# Patient Record
Sex: FEMALE | Race: BLACK OR AFRICAN AMERICAN
[De-identification: names, ages, dates, MRNs, and addresses within clinical notes are randomized per-mention and may not be internally consistent; named-entity substitution may affect disease eponyms.]

---

## 2019-04-08 ENCOUNTER — HOSPITAL ENCOUNTER (OUTPATIENT)
Dept: HOSPITAL 97 - ER | Age: 64
Setting detail: OBSERVATION
LOS: 3 days | Discharge: HOME | End: 2019-04-11
Attending: INTERNAL MEDICINE | Admitting: INTERNAL MEDICINE
Payer: COMMERCIAL

## 2019-04-08 VITALS — BODY MASS INDEX: 54.6 KG/M2

## 2019-04-08 DIAGNOSIS — K57.90: ICD-10-CM

## 2019-04-08 DIAGNOSIS — K44.9: ICD-10-CM

## 2019-04-08 DIAGNOSIS — R11.2: ICD-10-CM

## 2019-04-08 DIAGNOSIS — K21.0: ICD-10-CM

## 2019-04-08 DIAGNOSIS — K29.70: Primary | ICD-10-CM

## 2019-04-08 LAB
ALBUMIN SERPL BCP-MCNC: 3.7 G/DL (ref 3.4–5)
ALP SERPL-CCNC: 107 U/L (ref 45–117)
ALT SERPL W P-5'-P-CCNC: 21 U/L (ref 12–78)
AST SERPL W P-5'-P-CCNC: 13 U/L (ref 15–37)
BUN BLD-MCNC: 19 MG/DL (ref 7–18)
GLUCOSE SERPLBLD-MCNC: 330 MG/DL (ref 74–106)
HCT VFR BLD CALC: 40.7 % (ref 36–45)
INR BLD: 1.12
LYMPHOCYTES # SPEC AUTO: 1.8 K/UL (ref 0.7–4.9)
MAGNESIUM SERPL-MCNC: 1.8 MG/DL (ref 1.8–2.4)
NT-PROBNP SERPL-MCNC: 159 PG/ML (ref ?–125)
PMV BLD: 9.7 FL (ref 7.6–11.3)
POTASSIUM SERPL-SCNC: 3 MMOL/L (ref 3.5–5.1)
RBC # BLD: 4.92 M/UL (ref 3.86–4.86)
TROPONIN (EMERG DEPT USE ONLY): < 0.02 NG/ML (ref 0–0.04)

## 2019-04-08 PROCEDURE — 80048 BASIC METABOLIC PNL TOTAL CA: CPT

## 2019-04-08 PROCEDURE — 88305 TISSUE EXAM BY PATHOLOGIST: CPT

## 2019-04-08 PROCEDURE — 93005 ELECTROCARDIOGRAM TRACING: CPT

## 2019-04-08 PROCEDURE — 96374 THER/PROPH/DIAG INJ IV PUSH: CPT

## 2019-04-08 PROCEDURE — 80076 HEPATIC FUNCTION PANEL: CPT

## 2019-04-08 PROCEDURE — 87086 URINE CULTURE/COLONY COUNT: CPT

## 2019-04-08 PROCEDURE — 81003 URINALYSIS AUTO W/O SCOPE: CPT

## 2019-04-08 PROCEDURE — 84484 ASSAY OF TROPONIN QUANT: CPT

## 2019-04-08 PROCEDURE — 74175 CTA ABDOMEN W/CONTRAST: CPT

## 2019-04-08 PROCEDURE — 84132 ASSAY OF SERUM POTASSIUM: CPT

## 2019-04-08 PROCEDURE — 83880 ASSAY OF NATRIURETIC PEPTIDE: CPT

## 2019-04-08 PROCEDURE — 71275 CT ANGIOGRAPHY CHEST: CPT

## 2019-04-08 PROCEDURE — 88312 SPECIAL STAINS GROUP 1: CPT

## 2019-04-08 PROCEDURE — 82962 GLUCOSE BLOOD TEST: CPT

## 2019-04-08 PROCEDURE — 36415 COLL VENOUS BLD VENIPUNCTURE: CPT

## 2019-04-08 PROCEDURE — 87088 URINE BACTERIA CULTURE: CPT

## 2019-04-08 PROCEDURE — 83735 ASSAY OF MAGNESIUM: CPT

## 2019-04-08 PROCEDURE — 99285 EMERGENCY DEPT VISIT HI MDM: CPT

## 2019-04-08 PROCEDURE — 96375 TX/PRO/DX INJ NEW DRUG ADDON: CPT

## 2019-04-08 PROCEDURE — 85025 COMPLETE CBC W/AUTO DIFF WBC: CPT

## 2019-04-08 PROCEDURE — 71045 X-RAY EXAM CHEST 1 VIEW: CPT

## 2019-04-08 PROCEDURE — 83690 ASSAY OF LIPASE: CPT

## 2019-04-08 PROCEDURE — 85610 PROTHROMBIN TIME: CPT

## 2019-04-08 RX ADMIN — SODIUM CHLORIDE SCH MG: 0.9 INJECTION, SOLUTION INTRAVENOUS at 22:24

## 2019-04-08 RX ADMIN — HUMAN INSULIN SCH UNIT: 100 INJECTION, SOLUTION SUBCUTANEOUS at 22:53

## 2019-04-08 RX ADMIN — SODIUM CHLORIDE AND POTASSIUM CHLORIDE SCH MLS: .9; .15 SOLUTION INTRAVENOUS at 17:00

## 2019-04-08 RX ADMIN — Medication SCH ML: at 22:54

## 2019-04-08 NOTE — RAD REPORT
EXAM DESCRIPTION:  CT - Angio  Aorta For Dissection - 4/8/2019 3:37 pm

 

CLINICAL HISTORY:  Chest pain radiating to the back.

Abdominal distention;Pain;Dissection

 

COMPARISON:  No comparisons

 

TECHNIQUE:  CT angiography of the aorta was performed with MIPs.

 

All CT scans are performed using dose optimization technique as appropriate and may include automated
 exposure control or mA/KV adjustment according to patient size.

 

FINDINGS:  A left aortic arch is present with normal branching pattern of the great vessels.No acute 
aortic finding is seen such as aneurysm, penetrating ulcer or dissection.  The celiac axis, SMA, YVONNE 
and renal arteries are widely patent.

 

No evidence of pulmonary embolism.

 

The lungs are clear.

 

The liver demonstrates no focal mass or biliary dilatation.Cholecystectomy clips.The spleen, pancreas
, adrenal glands and kidneys are within normal limits for arterial phase imaging.Moderate fat contain
ing umbilical hernia.

 

No bowel obstruction, free fluid or abscess.Sigmoid diverticulosis diverticulitis. Normal appendix.No
 pathologic enlarged lymphadenopathy identified.

 

No fracture or worrisome bone lesion seen.Lower lumbar degenerative changes are present.

 

 

IMPRESSION:  No acute aortic finding is demonstrated.

 

Moderate fat containing umbilical hernia.

## 2019-04-08 NOTE — ER
Nurse's Notes                                                                                     

 [facility]                                                                 

Name: Liane Burk                                                                               

Age: 64 yrs                                                                                       

Sex: Female                                                                                       

: 1955                                                                                   

MRN: [id number]                                                                                   

Arrival Date: 2019                                                                          

Time: 13:48                                                                                       

Account#: [id number]                                                                            

Bed 18                                                                                            

Private MD: Erickson Cano V                                                                      

Diagnosis: Chest pain, unspecified;Dysphagia;Abdominal tenderness;Essential (primary)             

  hypertension;Obesity, unspecified;Type 2 diabetes mellitus;Hypokalemia                          

                                                                                                  

Presentation:                                                                                     

                                                                                             

13:52 Presenting complaint: Patient states: epigastric discomfort and mid-low back pain x 3   ss  

      days. Transition of care: patient was not received from another setting of care. Onset      

      of symptoms was 2019. Risk Assessment: Do you want to hurt yourself or            

      someone else? Patient reports no desire to harm self or others. Initial Sepsis Screen:      

      Does the patient meet any 2 criteria? No. Patient's initial sepsis screen is negative.      

      Does the patient have a suspected source of infection? No. Patient's initial sepsis         

      screen is negative. Care prior to arrival: None.                                            

13:52 Method Of Arrival: Ambulatory                                                           ss  

13:52 Acuity: ROGELIO 3                                                                           ss  

                                                                                                  

Triage Assessment:                                                                                

13:55 General: Appears in no apparent distress. uncomfortable. General: Behavior is calm,     hj  

      cooperative, appropriate for age. Pain: Complains of pain in back and abdomen. GI:          

      Reports upper abdominal pain.                                                               

13:55 EENT: No signs and/or symptoms were reported regarding the EENT system. Neuro: Level of hj  

      Consciousness is awake, alert, obeys commands, Oriented to person, place, time,             

      situation, Appropriate for age. Cardiovascular: Capillary refill < 3 seconds Patient's      

      skin is warm and dry. Respiratory: Airway is patent Respiratory effort is even,             

      unlabored, Respiratory pattern is regular, symmetrical. : No signs and/or symptoms        

      were reported regarding the genitourinary system. Derm: No signs and/or symptoms            

      reported regarding the dermatologic system. Musculoskeletal: No signs and/or symptoms       

      reported regarding the musculoskeletal system.                                              

                                                                                                  

Historical:                                                                                       

- Allergies:                                                                                      

13:53 PENICILLINS;                                                                            ss  

- PMHx:                                                                                           

13:53 Hypertension;                                                                           ss  

- PSHx:                                                                                           

13:53 Hysterectomy; Cholecystectomy; removal of cyst from left breast;                        ss  

                                                                                                  

- Immunization history:: Adult Immunizations up to date.                                          

- Social history:: Smoking status: Patient/guardian denies using tobacco.                         

- Ebola Screening: : Patient denies exposure to infectious person Patient denies travel           

  to an Ebola-affected area in the 21 days before illness onset.                                  

                                                                                                  

                                                                                                  

Screenin:55 Abuse screen: Denies threats or abuse. Denies injuries from another. Nutritional        hj  

      screening: No deficits noted. Tuberculosis screening: No symptoms or risk factors           

      identified. Fall Risk None identified.                                                      

                                                                                                  

Assessment:                                                                                       

13:55 Reassessment: see triage for assessment;.                                               hj  

13:56 GI: Bowel sounds present X 4 quads.                                                     hj  

14:30 Reassessment: Patient and/or family updated on plan of care and expected duration. Pain hj  

      level reassessed. Patient is alert, oriented x 3, equal unlabored respirations, skin        

      warm/dry/pink. awaiting results and POC; Patient states feeling better.                     

15:30 Reassessment: Patient and/or family updated on plan of care and expected duration. Pain hj  

      level reassessed. Patient is alert, oriented x 3, equal unlabored respirations, skin        

      warm/dry/pink. awaiting POC;.                                                               

16:07 Reassessment: Patient and/or family updated on plan of care and expected duration. Pain hj  

      level reassessed. Patient is alert, oriented x 3, equal unlabored respirations, skin        

      warm/dry/pink. awaiting POC;.                                                               

18:08 Reassessment: Patient and/or family updated on plan of care and expected duration. Pain hj  

      level reassessed. Patient is alert, oriented x 3, equal unlabored respirations, skin        

      warm/dry/pink. PCP in room with family; Patient states feeling better.                      

18:21 Reassessment: Patient and/or family updated on plan of care and expected duration. Pain hj  

      level reassessed. Patient is alert, oriented x 3, equal unlabored respirations, skin        

      warm/dry/pink. awaiting room placement;.                                                    

19:04 Reassessment: Patient appears in no apparent distress at this time. Patient and/or      ed1 

      family updated on plan of care and expected duration. Pain level reassessed. Patient is     

      alert, oriented x 3, equal unlabored respirations, skin warm/dry/pink. Patient denies       

      pain at this time.                                                                          

                                                                                                  

Vital Signs:                                                                                      

13:53  / 125; Pulse 95; Resp 18; Temp 98.1(TE); Pulse Ox 98% on R/A; Weight 128.82 kg;  ss  

      Height 5 ft. 0 in. (152.40 cm); Pain 8/10;                                                  

14:30  / 98; Pulse 87; Resp 18; Pulse Ox 100% on R/A;                                   hj  

15:30  / 88; Pulse 85; Resp 18; Pulse Ox 100% on R/A;                                   hj  

16:08  / 95; Pulse 85; Resp 18; Pulse Ox 100% on R/A;                                   hj  

17:00  / 90; Pulse 87; Resp 18; Pulse Ox 100% on R/A;                                   hj  

17:22  / 88; Pulse 75; Resp 18; Pulse Ox 100% on R/A;                                   hj  

18:09  / 85; Pulse 74; Resp 18; Pulse Ox 100% on R/A;                                   hj  

18:20  / 89; Pulse 78; Resp 18; Pulse Ox 100% on R/A;                                   hj  

19:04  / 86; Pulse 80; Resp 20; Temp 98.1(O); Pulse Ox 100% on R/A; Pain 0/10;          ed1 

20:35  / 88; Pulse 72; Resp 18; Temp 98.4(O); Pulse Ox 100% on R/A; Pain 4/10;          ed1 

13:53 Body Mass Index 55.46 (128.82 kg, 152.40 cm)                                              

                                                                                                  

ED Course:                                                                                        

13:48 Patient arrived in ED.                                                                  mr  

13:48 Erickson Cano MD is Private Physician.                                                 mr  

13:53 Triage completed.                                                                       ss  

13:53 Arm band placed on right wrist.                                                         ss  

13:55 Jethro Kaur RN is Primary Nurse.                                                    hj  

13:56 Patient has correct armband on for positive identification. Placed in gown. Bed in low  hj  

      position. Call light in reach. Side rails up X 1.                                           

14:01 Jas Schultz MD is Attending Physician.                                             darren 

14:37 EKG done, by EKG tech. reviewed by Jas Schultz MD.                                   at1 

14:52 XRAY Chest (1 view) In Process Unspecified.                                             EDMS

15:37 CT Aorta for Dissection In Process Unspecified.                                         EDMS

15:51 No provider procedures requiring assistance completed. Patient did not have IV access   hj  

      during this emergency room visit.                                                           

16:26 Erickson Cano MD is Hospitalizing Provider.                                            darren 

19:04 Primary Nurse role handed off by Jethro Kaur, VIOLETTA                                     ed1 

19:04 Vazquez, Barbara, RN is Primary Nurse.                                                      ed1 

19:04 Resting quietly. Awaiting bed assignment.                                               ed1 

                                                                                                  

Administered Medications:                                                                         

15:46 Drug: NS 0.9% 1000 ml Route: IV; Rate: 125 ml/hr; Site: left antecubital;               hj  

15:46 Drug: morphine 4 mg Route: IVP; Site: left antecubital;                                 hj  

16:47 Follow up: Response: No adverse reaction; Pain is decreased                             hj  

15:46 Drug: Zofran 4 mg Route: IVP; Site: left antecubital;                                   hj  

16:35 Follow up: Response: No adverse reaction                                                hj  

16:36 Follow up: Response: No adverse reaction                                                hj  

15:46 Drug: Potassium Chloride 20 mEq Route: IV; Rate: per protocol; Site: left antecubital;  hj  

15:46 Drug: Potassium Effervescent Tablet 25 mEq Route: PO;                                   hj  

16:35 Follow up: Response: No adverse reaction                                                hj  

15:46 Drug: Pepcid 20 mg Route: IVP; Site: left antecubital;                                  hj  

16:35 Follow up: Response: No adverse reaction                                                hj  

15:47 Drug: Insulin Regular Human 6 units {Co-Signature: frieda (Jethro Kaur RN).} Route: IVP;  jl7 

      Site: left antecubital;                                                                     

16:35 Follow up: Response: No adverse reaction                                                frieda  

                                                                                                  

                                                                                                  

Point of Care Testing:                                                                            

      Blood Glucose:                                                                              

17:00 Blood Glucose: 212 mg/dL;                                                               frieda  

      Ranges:                                                                                     

                                                                                                  

Outcome:                                                                                          

16:28 Decision to Hospitalize by Provider.                                                    [facility] 

21:13 Admitted to Med/surg accompanied by tech, family with patient, via stretcher, room 211, ed1 

      with chart, Report called to  VIOLETTA Agosto                                                      

21:13 Condition: good                                                                             

21:13 Discharge instructions given to patient, family, Instructed on the need for admit,          

      Demonstrated understanding of instructions.                                                 

21:14 Patient left the ED.                                                                    ed1 

                                                                                                  

Signatures:                                                                                       

Dispatcher [employer]                           EDJas Abreu MD MD cha Rivera, Mary mr Smirch, Shelby, RN RN ss Riggs, Erika, RN                        RN   ed1                                                  

Amarilys Valdez, EKG Tech              EKG Tat1                                                  

Jethro Kaur RN RN hj Leal, Jahala, RN RN   jl7                                                  

Jethro malave                                                   

                                                                                                  

**************************************************************************************************

## 2019-04-08 NOTE — RAD REPORT
EXAM DESCRIPTION:  RAD - Chest Single View - 4/8/2019 2:52 pm

 

CLINICAL HISTORY:  Back pain, epigastric pain

 

COMPARISON:  April 2016

 

TECHNIQUE:  AP portable chest image was obtained 1446 hours .

 

FINDINGS:  No peripheral focal lung parenchymal process seen. Interstitial pattern is not substantial
ly different from comparison when adjusting for technique differences. Baseline pattern could mask mi
nimal interstitial edema or infiltrate.

 

 Heart and vasculature are normal. No measurable pleural effusion and no pneumothorax. No acute bony 
abnormality seen. No acute aortic findings suspected.

 

IMPRESSION:  Chest is not substantially different from April 2016 comparison.

 

Baseline pattern could mask earliest interstitial edema or infiltrate.

## 2019-04-08 NOTE — EDPHYS
Physician Documentation                                                                           

 [facility]                                                                 

Name: Liane Burk                                                                               

Age: 64 yrs                                                                                       

Sex: Female                                                                                       

: 1955                                                                                   

MRN: [id number]                                                                                   

Arrival Date: 2019                                                                          

Time: 13:48                                                                                       

Account#: [id number]                                                                            

Bed 18                                                                                            

Private MD: Erickson Cano V                                                                      

ED Physician Jas Schultz                                                                      

HPI:                                                                                              

                                                                                             

15:24 This 64 yrs old Black Female presents to ER via Ambulatory with complaints of Abdominal darren 

      Pain, Back Pain.                                                                            

15:24 The patient presents with pain that is acute, that is chronic. The symptoms are located darren 

      in the low back, lumbar area, left low back and right low back. Onset: The                  

      symptoms/episode began/occurred just prior to arrival. The pain does not radiate.           

                                                                                                  

Historical:                                                                                       

- Allergies:                                                                                      

13:53 PENICILLINS;                                                                            ss  

- PMHx:                                                                                           

13:53 Hypertension;                                                                           ss  

- PSHx:                                                                                           

13:53 Hysterectomy; Cholecystectomy; removal of cyst from left breast;                        ss  

                                                                                                  

- Immunization history:: Adult Immunizations up to date.                                          

- Social history:: Smoking status: Patient/guardian denies using tobacco.                         

- Ebola Screening: : Patient denies exposure to infectious person Patient denies travel           

  to an Ebola-affected area in the 21 days before illness onset.                                  

                                                                                                  

                                                                                                  

ROS:                                                                                              

15:24 Eyes: Negative for injury, pain, redness, and discharge, ENT: Negative for injury,      darren 

      pain, and discharge, Neck: Negative for injury, pain, and swelling, Respiratory:            

      Negative for shortness of breath, cough, wheezing, and pleuritic chest pain, :            

      Negative for injury, bleeding, discharge, and swelling, MS/Extremity: Negative for          

      injury and deformity, Skin: Negative for injury, rash, and discoloration, Neuro:            

      Negative for headache, weakness, numbness, tingling, and seizure, Psych: Negative for       

      depression, anxiety, suicide ideation, homicidal ideation, and hallucinations,              

      Allergy/Immunology: Negative for hives, rash, and allergies, Endocrine: Negative for        

      neck swelling, polydipsia, polyuria, polyphagia, and marked weight changes,                 

      Hematologic/Lymphatic: Negative for swollen nodes, abnormal bleeding, and unusual           

      bruising.                                                                                   

15:24 Constitutional: Positive for malaise.                                                       

15:24 Cardiovascular: Positive for chest pain.                                                    

15:24 Respiratory: Negative for cough, dyspnea on exertion.                                       

15:24 Abdomen/GI: Positive for abdominal pain, of the epigastric area, right upper quadrant       

      and left upper quadrant.                                                                    

                                                                                                  

Exam:                                                                                             

15:24 Constitutional:  This is a well developed, well nourished patient who is awake, alert,  darren 

      and in no acute distress. Head/Face:  Normocephalic, atraumatic. Eyes:  Pupils equal        

      round and reactive to light, extra-ocular motions intact.  Lids and lashes normal.          

      Conjunctiva and sclera are non-icteric and not injected.  Cornea within normal limits.      

      Periorbital areas with no swelling, redness, or edema. ENT:  Nares patent. No nasal         

      discharge, no septal abnormalities noted.  Tympanic membranes are normal and external       

      auditory canals are clear.  Oropharynx with no redness, swelling, or masses, exudates,      

      or evidence of obstruction, uvula midline.  Mucous membranes moist. Neck:  Trachea          

      midline, no thyromegaly or masses palpated, and no cervical lymphadenopathy.  Supple,       

      full range of motion without nuchal rigidity, or vertebral point tenderness.  No            

      Meningismus. Chest/axilla:  Normal chest wall appearance and motion.  Nontender with no     

      deformity.  No lesions are appreciated. Cardiovascular:  Regular rate and rhythm with a     

      normal S1 and S2.  No gallops, murmurs, or rubs.  Normal PMI, no JVD.  No pulse             

      deficits. Respiratory:  Lungs have equal breath sounds bilaterally, clear to                

      auscultation and percussion.  No rales, rhonchi or wheezes noted.  No increased work of     

      breathing, no retractions or nasal flaring. Back:  No spinal tenderness.  No                

      costovertebral tenderness.  Full range of motion. Female :  Normal external               

      genitalia. Skin:  Warm, dry with normal turgor.  Normal color with no rashes, no            

      lesions, and no evidence of cellulitis. MS/ Extremity:  Pulses equal, no cyanosis.          

      Neurovascular intact.  Full, normal range of motion. Neuro:  Awake and alert, GCS 15,       

      oriented to person, place, time, and situation.  Cranial nerves II-XII grossly intact.      

      Motor strength 5/5 in all extremities.  Sensory grossly intact.  Cerebellar exam            

      normal.  Normal gait. Psych:  Awake, alert, with orientation to person, place and time.     

       Behavior, mood, and affect are within normal limits.                                       

15:24 Abdomen/GI: Inspection: abdomen appears normal, Bowel sounds: normal, Palpation: mild       

      abdominal tenderness, Liver: no appreciated palpable abnormalities, Hernia: not             

      appreciated.                                                                                

                                                                                                  

Vital Signs:                                                                                      

13:53  / 125; Pulse 95; Resp 18; Temp 98.1(TE); Pulse Ox 98% on R/A; Weight 128.82 kg;  ss  

      Height 5 ft. 0 in. (152.40 cm); Pain 8/10;                                                  

14:30  / 98; Pulse 87; Resp 18; Pulse Ox 100% on R/A;                                   hj  

15:30  / 88; Pulse 85; Resp 18; Pulse Ox 100% on R/A;                                   hj  

16:08  / 95; Pulse 85; Resp 18; Pulse Ox 100% on R/A;                                   hj  

17:00  / 90; Pulse 87; Resp 18; Pulse Ox 100% on R/A;                                   hj  

17:22  / 88; Pulse 75; Resp 18; Pulse Ox 100% on R/A;                                   hj  

18:09  / 85; Pulse 74; Resp 18; Pulse Ox 100% on R/A;                                   hj  

18:20  / 89; Pulse 78; Resp 18; Pulse Ox 100% on R/A;                                   hj  

19:04  / 86; Pulse 80; Resp 20; Temp 98.1(O); Pulse Ox 100% on R/A; Pain 0/10;          ed1 

20:35  / 88; Pulse 72; Resp 18; Temp 98.4(O); Pulse Ox 100% on R/A; Pain 4/10;          ed1 

13:53 Body Mass Index 55.46 (128.82 kg, 152.40 cm)                                            ss  

                                                                                                  

MDM:                                                                                              

14:01 Patient medically screened.                                                             [facility] 

15:26 Data reviewed: vital signs, nurses notes, lab test result(s), EKG, radiologic studies,  [facility] 

      CT scan, plain films.                                                                       

                                                                                                  

                                                                                             

14:18 Order name: Basic Metabolic Panel; Complete Time: 15:21                                   

                                                                                             

14:18 Order name: CBC with Diff; Complete Time: 15:21                                           

                                                                                             

14:18 Order name: LFT's; Complete Time: 15:21                                                   

                                                                                             

14:18 Order name: Magnesium; Complete Time: 15:21                                               

                                                                                             

14:18 Order name: NT PRO-BNP; Complete Time: 15:21                                              

                                                                                             

14:18 Order name: PT-INR; Complete Time: 15:21                                                  

                                                                                             

14:18 Order name: Troponin (emerg Dept Use Only); Complete Time: 15:21                          

                                                                                             

15:21 Order name: Lipase; Complete Time: 16:17                                                [facility] 

                                                                                             

15:21 Order name: Urine Culture                                                               [facility] 

                                                                                             

16:46 Order name: Urine Dipstick--Ancillary (enter results)                                     

                                                                                             

17:08 Order name: Urine Dipstick-Ancillary                                                    [facility]

                                                                                             

18:25 Order name: Troponin I                                                                  [facility]

                                                                                             

18:46 Order name: Potassium                                                                   [facility]

                                                                                             

21:04 Order name: Potassium                                                                   mt  

                                                                                             

13:55 Order name: EKG; Complete Time: 13:55                                                     

                                                                                             

13:55 Order name: EKG - Nurse/Tech; Complete Time: 14:44                                        

                                                                                             

14:18 Order name: XRAY Chest (1 view); Complete Time: 15:21                                     

                                                                                             

14:18 Order name: Cardiac monitoring; Complete Time: 14:19                                      

                                                                                             

15:21 Order name: CT Aorta for Dissection; Complete Time: 16:17                               [facility] 

                                                                                             

16:33 Order name: CONS Physician Consult                                                      [facility]

                                                                                             

16:33 Order name: CONS Physician Consult                                                      [facility]

                                                                                             

21:13 Order name: Potassium                                                                   [facility]

                                                                                             

14:18 Order name: EKG - Nurse/Tech; Complete Time: 14:43                                        

                                                                                             

14:18 Order name: IV Saline Lock; Complete Time: 14:19                                          

                                                                                             

14:18 Order name: Labs collected and sent; Complete Time: 14:19                                 

                                                                                             

14:18 Order name: O2 Per Protocol; Complete Time: 14:19                                         

                                                                                             

14:18 Order name: O2 Sat Monitoring; Complete Time: 14:19                                       

                                                                                             

15:21 Order name: Urine Dipstick-Ancillary (obtain specimen); Complete Time: 16:48            [facility] 

                                                                                                  

Administered Medications:                                                                         

15:46 Drug: NS 0.9% 1000 ml Route: IV; Rate: 125 ml/hr; Site: left antecubital;               hj  

15:46 Drug: morphine 4 mg Route: IVP; Site: left antecubital;                                 hj  

16:47 Follow up: Response: No adverse reaction; Pain is decreased                             hj  

15:46 Drug: Zofran 4 mg Route: IVP; Site: left antecubital;                                   hj  

16:35 Follow up: Response: No adverse reaction                                                hj  

16:36 Follow up: Response: No adverse reaction                                                hj  

15:46 Drug: Potassium Chloride 20 mEq Route: IV; Rate: per protocol; Site: left antecubital;  hj  

15:46 Drug: Potassium Effervescent Tablet 25 mEq Route: PO;                                   hj  

16:35 Follow up: Response: No adverse reaction                                                hj  

15:46 Drug: Pepcid 20 mg Route: IVP; Site: left antecubital;                                  hj  

16:35 Follow up: Response: No adverse reaction                                                  

15:47 Drug: Insulin Regular Human 6 units {Co-Signature: frieda (Jethro Kaur RN).} Route: IVP;  jl7 

      Site: left antecubital;                                                                     

16:35 Follow up: Response: No adverse reaction                                                frieda  

                                                                                                  

                                                                                                  

Point of Care Testing:                                                                            

      Blood Glucose:                                                                              

17:00 Blood Glucose: 212 mg/dL;                                                               frieda  

      Ranges:                                                                                     

      Critical Glucose Levels:Adult <50 mg/dl or >400 mg/dl  <40 mg/dl or >180 mg/dl       

Disposition:                                                                                      

19 16:28 Hospitalization ordered by Erickson Cano for Observation. Preliminary diagnosis    

  are Chest pain, unspecified, Dysphagia, Abdominal tenderness, Essential                         

  (primary) hypertension, Obesity, unspecified, Type 2 diabetes mellitus,                         

  Hypokalemia.                                                                                    

- Bed requested for Telemetry/MedSurg (observation).                                              

- Status is Observation.                                                                      ed1 

- Condition is Fair.                                                                              

- Problem is new.                                                                                 

- Symptoms have improved.                                                                         

UTI on Admission? No                                                                              

                                                                                                  

                                                                                                  

                                                                                                  

Signatures:                                                                                       

Dispatcher [employer]                           EDMS                                                 

Jas Schultz MD MD cha Smirch, Shelby, RN RN ss Riggs, Erika, RN RN   ed1                                                  

Jethro Kaur RN RN hj Leal, Jahala, RN RN   jl7                                                  

Marilou Coleman RN RN   df                                                   

Jethro malave                                                   

                                                                                                  

Corrections: (The following items were deleted from the chart)                                    

19:47 16:28 Hospitalization Ordered by Erickson Cano MD for Observation. Preliminary diagnosis df  

      is Chest pain, unspecified; Dysphagia; Abdominal tenderness; Essential (primary)            

      hypertension; Obesity, unspecified; Type 2 diabetes mellitus; Hypokalemia. Bed              

      requested for Telemetry/MedSurg (observation). Status is Observation. Condition is          

      Fair. Problem is new. Symptoms have improved. UTI on Admission? No. darren                     

21:14 19:47 2019 16:28 Hospitalization Ordered by Erickson Cano MD for Observation.      ed1 

      Preliminary diagnosis is Chest pain, unspecified; Dysphagia; Abdominal tenderness;          

      Essential (primary) hypertension; Obesity, unspecified; Type 2 diabetes mellitus;           

      Hypokalemia. Bed requested for Telemetry/MedSurg (observation). Status is Observation.      

      Condition is Fair. Problem is new. Symptoms have improved. UTI on Admission? No. df         

                                                                                                  

**************************************************************************************************

## 2019-04-09 LAB
BUN BLD-MCNC: 14 MG/DL (ref 7–18)
GLUCOSE SERPLBLD-MCNC: 151 MG/DL (ref 74–106)
HCT VFR BLD CALC: 36.1 % (ref 36–45)
LYMPHOCYTES # SPEC AUTO: 1.6 K/UL (ref 0.7–4.9)
PMV BLD: 9.7 FL (ref 7.6–11.3)
POTASSIUM SERPL-SCNC: 3.6 MMOL/L (ref 3.5–5.1)
RBC # BLD: 4.36 M/UL (ref 3.86–4.86)

## 2019-04-09 RX ADMIN — SODIUM CHLORIDE SCH MG: 0.9 INJECTION, SOLUTION INTRAVENOUS at 09:22

## 2019-04-09 RX ADMIN — HUMAN INSULIN SCH: 100 INJECTION, SOLUTION SUBCUTANEOUS at 07:30

## 2019-04-09 RX ADMIN — MORPHINE SULFATE PRN MG: 4 INJECTION, SOLUTION INTRAMUSCULAR; INTRAVENOUS at 01:09

## 2019-04-09 RX ADMIN — HUMAN INSULIN SCH UNIT: 100 INJECTION, SOLUTION SUBCUTANEOUS at 22:01

## 2019-04-09 RX ADMIN — METRONIDAZOLE SCH MLS: 500 INJECTION, SOLUTION INTRAVENOUS at 21:57

## 2019-04-09 RX ADMIN — HUMAN INSULIN SCH: 100 INJECTION, SOLUTION SUBCUTANEOUS at 11:30

## 2019-04-09 RX ADMIN — MORPHINE SULFATE PRN MG: 4 INJECTION, SOLUTION INTRAMUSCULAR; INTRAVENOUS at 22:16

## 2019-04-09 RX ADMIN — SODIUM CHLORIDE AND POTASSIUM CHLORIDE SCH MLS: .9; .15 SOLUTION INTRAVENOUS at 06:55

## 2019-04-09 RX ADMIN — SODIUM CHLORIDE SCH MG: 0.9 INJECTION, SOLUTION INTRAVENOUS at 22:15

## 2019-04-09 RX ADMIN — Medication SCH ML: at 09:00

## 2019-04-09 RX ADMIN — Medication SCH: at 21:00

## 2019-04-09 RX ADMIN — CIPROFLOXACIN SCH: 2 INJECTION, SOLUTION INTRAVENOUS at 11:00

## 2019-04-09 RX ADMIN — MORPHINE SULFATE PRN MG: 4 INJECTION, SOLUTION INTRAMUSCULAR; INTRAVENOUS at 09:23

## 2019-04-09 RX ADMIN — METRONIDAZOLE SCH MLS: 500 INJECTION, SOLUTION INTRAVENOUS at 15:00

## 2019-04-09 RX ADMIN — ATENOLOL SCH MG: 50 TABLET ORAL at 22:17

## 2019-04-09 RX ADMIN — ASPIRIN SCH: 81 TABLET, COATED ORAL at 09:00

## 2019-04-09 RX ADMIN — CIPROFLOXACIN SCH MLS: 2 INJECTION, SOLUTION INTRAVENOUS at 22:23

## 2019-04-09 RX ADMIN — HUMAN INSULIN SCH UNIT: 100 INJECTION, SOLUTION SUBCUTANEOUS at 16:30

## 2019-04-09 RX ADMIN — SODIUM CHLORIDE AND POTASSIUM CHLORIDE SCH: .9; .15 SOLUTION INTRAVENOUS at 13:00

## 2019-04-09 NOTE — P.PN
Subjective


Date of Service: 04/09/19


Chief Complaint: DIFFUSE ABDOMEN PAIN.


Subjective: No new changes





Review of Systems


10-point ROS is otherwise unremarkable


General: Weakness, Malaise


Gastrointestinal: Abdominal Pain





Physical Examination





- Vital Signs


Temperature: 97.1 F


Blood Pressure: 137/74


Pulse: 65


Respirations: 16


Pulse Ox (%): 97





- Physical Exam


General: Alert


HEENT: Atraumatic, PERRLA, EOMI


Neck: Supple, JVD not distended


Respiratory: Clear to auscultation bilaterally, Normal air movement


Cardiovascular: Regular rate/rhythm, Normal S1 S2


Gastrointestinal: Tenderness (DIFFUSE MILD TO MODERATE.)


Musculoskeletal: No tenderness


Integumentary: No rashes


Neurological: Normal speech, Normal tone, Normal affect


Lymphatics: No axilla or inguinal lymphadenopathy





- Studies


Laboratory Data (last 24 hrs)





04/08/19 14:20: Lipase 158


04/08/19 14:20: PT 13.2 H, INR 1.12


04/08/19 14:20: WBC 10.3, Hgb 13.8, Hct 40.7, Plt Count 299


04/08/19 14:20: Sodium 139, Potassium 3.0 L, BUN 19 H, Creatinine 0.98, Glucose 

330 H, Magnesium 1.8, Total Bilirubin 0.4, AST 13 L, ALT 21, Alkaline 

Phosphatase 107





Medications List Reviewed: Yes





Assessment And Plan





- Current Problems (Diagnosis)


(1) Epigastric pain


Current Visit: Yes   Status: Acute   


Plan: 


MOSTLY FROM ACID, VS VIRAL SYNDROME 


RAISE PROTONIX IV BID. 





DC IN AM POSSIBLE. 





HER PAIN HAS LOW POSSIBILTY OF BEING CARDIAC IN ORIGIN. 














(2) GERD (gastroesophageal reflux disease)


Current Visit: Yes   Status: Acute   


Plan: 


IV PROTONIX


Qualifiers: 


   Esophagitis presence: with esophagitis   Qualified Code(s): K21.0 - Gastro-

esophageal reflux disease with esophagitis   





(3) Nausea & vomiting


Current Visit: No   Status: Acute   


Plan: 


SHOULD IMPROVE 


NO MECHANICAL ISSUES. 








(4) Diffuse abdominal pain


Current Visit: Yes   Status: Acute   


Plan: 


DIVERICULITIS POSSIBLE.


I TALKED TO DR CASILLAS TO RULE OUT DIVERTICULITIS. 


HE TOLD ME YES-  LOOKS LIKE LOTS OF DIVERTICULOSIS BUT NO DIVERTICULITIS. 


CELIAC AND MESENTERIC ARTERIES ARE GOOD.

## 2019-04-09 NOTE — EKG
Test Date:    2019-04-09               Test Time:    08:06:20

Technician:   PHILIPPE                                     

                                                     

MEASUREMENT RESULTS:                                       

Intervals:                                           

Rate:         71                                     

DE:           166                                    

QRSD:         82                                     

QT:           430                                    

QTc:          467                                    

Axis:                                                

P:            49                                     

DE:           166                                    

QRS:          -25                                    

T:            -2                                     

                                                     

INTERPRETIVE STATEMENTS:                                       

                                                     

Normal sinus rhythm

Non specific T abnormality

Abnormal ECG

Compared to ECG 04/08/2019 14:09:59

Atrial premature complex(es) no longer present

Ventricular premature complex(es) no longer present



Electronically Signed On 04-09-19 10:00:43 CDT by Jagdeep Kimball

## 2019-04-09 NOTE — RAD REPORT
EXAM DESCRIPTION:  RAD - Chest Single View - 4/9/2019 6:43 am

 

CLINICAL HISTORY:  Chest Pain

Chest pain.

 

COMPARISON:  Chest Single View dated 4/8/2019; Chest Pa And Lat (2 Views) dated 4/15/2016; CHEST SING
LE VIEW dated 5/27/2015; CHEST SINGLE VIEW dated 1/26/2014

 

FINDINGS:  Portable technique limits examination quality.

 

The lungs are grossly clear. The heart is normal in size. No displaced fractures.

 

IMPRESSION:  No acute intrathoracic process suspected.

## 2019-04-09 NOTE — EKG
Test Date:    2019-04-08               Test Time:    14:09:59

Technician:   YAZMIN                                     

                                                     

MEASUREMENT RESULTS:                                       

Intervals:                                           

Rate:         94                                     

CO:           150                                    

QRSD:         88                                     

QT:           406                                    

QTc:          507                                    

Axis:                                                

P:            36                                     

CO:           150                                    

QRS:          -32                                    

T:            23                                     

                                                     

INTERPRETIVE STATEMENTS:                                       

                                                     

Sinus rhythm with occasional premature ventricular complexes and premature

atrial complexes

Left axis deviation

Nonspecific ST abnormality

Prolonged QT

Abnormal ECG

Compared to ECG 04/15/2016 12:46:14

Atrial premature complex(es) now present

Ventricular premature complex(es) now present

Left-axis deviation now present

ST (T wave) deviation now present

Prolonged QT interval now present

Sinus arrhythmia no longer present



Electronically Signed On 04-08-19 16:05:59 CDT by Jagdeep Kimball

## 2019-04-09 NOTE — CON
History Of Present Illness:  Mrs. Burk is 64 years old.  She came to the hospital with epigastric pa
in.  It is well below the xiphoid.  She also has lower abdominal pain.  It has been going on for rika
ral days without any readmittance, and since being in the hospital, it is improved slightly.  Her out
patient medications did not include any kind of antacid, but she is getting IV Protonix 40 b.i.d. now
.  She does not have a history of heart disease.  In 2012, a cardiac cath was normal; it was after a 
stress test was abnormal, so she has a history of a false-positive nuclear stress test.  She does not
 have diabetes, dyslipidemia, never had myocardial infarction, stroke, not having chest pain, or abdo
melody pain.  She has a history of diverticulitis and diverticulosis.



Home Medications:  Atenolol, tramadol, nisoldipine, olmesartan, hydrochlorothiazide, and potassium ch
loride.



Allergies:  SHE IS ALLERGIC TO PENICILLIN.



Physical Examination:

General:  She is obese, alert, oriented, pleasant, cooperative, not in distress. 

Vital Signs:  5 feet tall, 280 pounds. 

HEENT:  Normal. 

Lungs:  Clear. 

Cardiac:  Within normal limits. 

Abdomen:  Soft. 

Extremities:  Normal.



Imaging:  EKG does not show infarction, injury, or ischemia.



Impression:  My impression is that Mrs. Burk is not having coronary artery disease or any symptoms t
hat sound remotely like coronary 

artery disease.  I am not going to recommend a cardiac workup at this time on Mrs. Burk.





JARRELL/JAE

DD:  04/09/2019 08:17:35Voice ID:  [id number]

DT:  04/09/2019 09:23:13Report ID:  [id number]

## 2019-04-10 PROCEDURE — 0DB98ZX EXCISION OF DUODENUM, VIA NATURAL OR ARTIFICIAL OPENING ENDOSCOPIC, DIAGNOSTIC: ICD-10-PCS

## 2019-04-10 PROCEDURE — 0DJ08ZZ INSPECTION OF UPPER INTESTINAL TRACT, VIA NATURAL OR ARTIFICIAL OPENING ENDOSCOPIC: ICD-10-PCS

## 2019-04-10 RX ADMIN — PANTOPRAZOLE SODIUM SCH MG: 40 TABLET, DELAYED RELEASE ORAL at 14:52

## 2019-04-10 RX ADMIN — METRONIDAZOLE SCH MLS: 500 INJECTION, SOLUTION INTRAVENOUS at 08:11

## 2019-04-10 RX ADMIN — Medication SCH: at 21:00

## 2019-04-10 RX ADMIN — METOCLOPRAMIDE SCH MG: 5 INJECTION, SOLUTION INTRAMUSCULAR; INTRAVENOUS at 14:51

## 2019-04-10 RX ADMIN — CIPROFLOXACIN SCH MLS: 2 INJECTION, SOLUTION INTRAVENOUS at 08:12

## 2019-04-10 RX ADMIN — SODIUM CHLORIDE SCH MG: 0.9 INJECTION, SOLUTION INTRAVENOUS at 11:01

## 2019-04-10 RX ADMIN — MORPHINE SULFATE PRN MG: 4 INJECTION, SOLUTION INTRAMUSCULAR; INTRAVENOUS at 14:50

## 2019-04-10 RX ADMIN — HUMAN INSULIN SCH UNIT: 100 INJECTION, SOLUTION SUBCUTANEOUS at 21:41

## 2019-04-10 RX ADMIN — POTASSIUM CHLORIDE SCH MEQ: 10 TABLET, FILM COATED, EXTENDED RELEASE ORAL at 14:52

## 2019-04-10 RX ADMIN — SODIUM CHLORIDE AND POTASSIUM CHLORIDE SCH: .9; .15 SOLUTION INTRAVENOUS at 19:00

## 2019-04-10 RX ADMIN — HUMAN INSULIN SCH UNIT: 100 INJECTION, SOLUTION SUBCUTANEOUS at 08:37

## 2019-04-10 RX ADMIN — ASPIRIN SCH MG: 81 TABLET, COATED ORAL at 14:52

## 2019-04-10 RX ADMIN — METOCLOPRAMIDE SCH MG: 5 INJECTION, SOLUTION INTRAMUSCULAR; INTRAVENOUS at 17:44

## 2019-04-10 RX ADMIN — Medication SCH: at 09:00

## 2019-04-10 RX ADMIN — ATENOLOL SCH MG: 50 TABLET ORAL at 21:41

## 2019-04-10 RX ADMIN — SODIUM CHLORIDE AND POTASSIUM CHLORIDE SCH: .9; .15 SOLUTION INTRAVENOUS at 09:00

## 2019-04-10 RX ADMIN — VALSARTAN SCH MG: 80 TABLET ORAL at 14:51

## 2019-04-10 RX ADMIN — Medication SCH ML: at 08:12

## 2019-04-10 RX ADMIN — HUMAN INSULIN SCH: 100 INJECTION, SOLUTION SUBCUTANEOUS at 16:30

## 2019-04-10 RX ADMIN — CIPROFLOXACIN SCH MLS: 2 INJECTION, SOLUTION INTRAVENOUS at 21:42

## 2019-04-10 RX ADMIN — METRONIDAZOLE SCH MLS: 500 INJECTION, SOLUTION INTRAVENOUS at 21:42

## 2019-04-10 RX ADMIN — SODIUM CHLORIDE SCH MG: 0.9 INJECTION, SOLUTION INTRAVENOUS at 22:22

## 2019-04-10 RX ADMIN — HUMAN INSULIN SCH: 100 INJECTION, SOLUTION SUBCUTANEOUS at 11:30

## 2019-04-10 RX ADMIN — SODIUM CHLORIDE AND POTASSIUM CHLORIDE SCH MLS: .9; .15 SOLUTION INTRAVENOUS at 01:05

## 2019-04-10 NOTE — ENDO RPT
[address], [address]





EGD PROCEDURE REPORT     EXAM DATE: 04/10/2019



PATIENT NAME:          Liane Burk          MR#:       [id number]

YOB: 1955     VISIT #:     [id number]

ATTENDING:     Jason Wright Dr     STATUS:     inpatient - \[facility] [place] [facility]\""

ASSISTANT:      Tammie Larios RN and Kathleen Ulrich



INDICATIONS:  The patient is a 64 yr old Female here for an EGD due to mid

epigastric abdominal pain, nausea and vomiting, GERD, and chest pain

PROCEDURE PERFORMED:     EGD with biopsy

MEDICATIONS:     Per Anesthesia.

TOPICAL ANESTHETIC:     none



CONSENT:  The patient understands the risks and benefits of the procedure and

understands that these risks include, but are not limited to: sedation,

allergic reaction, infection, perforation and/or bleeding. Alternative means of

evaluation and treatment include, among others: physical exam, x-rays, and/or

surgical intervention. The patient elects to proceed with this endoscopic

procedure.



DESCRIPTION OF PROCEDURE:  During intra-op preparation period all mechanical 

medical equipment was checked for proper function. Hand hygiene and appropriate

measures for infection prevention was taken.  Procedure, possible

complications, and alternatives including but not limited to the possibility of

bleeding, perforation, tear, infection, sepsis, need for surgery, need for

blood transfusion, and anesthesia related complications were explained to the

patient.  After the risks, benefits and alternatives of the procedure were

thoroughly explained, Informed consent was verified, confirmed and timeout was

successfully executed by the treatment team. The patient was  placed in the

left lateral position.  The patient was anesthetized with topical anesthesia.

Through the anesthetized oropharyngeal area, the scope was passed without any

difficulty.  The [id number] ([id number]) endoscope was introduced through the mouth

and advanced to the second portion of the duodenum.  Retroflexed views revealed

no abnormalities.  The gastroscope was then slowly withdrawn and removed.



A small sliding hiatal hernia was found.  Mild to moderate gastritis was found

in the body and the antrum of the stomach.  Multiple biopsies were obtained and

sent to pathology.







ADVERSE EVENTS:     There were no complications.

IMPRESSIONS:     1.  Small sliding hiatal hernia

2.  Mild to moderate gastritis in the body and the antrum of the stomach, s/p

biopsies



RECOMMENDATIONS:     1.  await biopsy results

2.  acid suppression therapy

REPEAT EXAM:





___________________________________

Jason Wright Dr

eSigned:  Jason Wright Dr 04/10/2019 1:22 PM





cc: Erickson Cano



CPT CODES:

ICD9 CODES:





PATIENT NAME:  Liane Burk

MR#: [id number]

## 2019-04-10 NOTE — P.PN
Subjective


Date of Service: 04/10/19


Chief Complaint: DIFFUSE ABDOMEN PAIN.


Subjective: Improving





SHE IS FEELING BETTER.  DR BRAGG WANTED TO DO EGD. DISCHARGE DEPENDS ON HIM 

NOW.  HE DOES NOT TAKE HER INSURANCE SO SHE WILL HAVE TO FIND A NEW GI DOCTOR.  





Review of Systems


10-point ROS is otherwise unremarkable


Gastrointestinal: Nausea





Physical Examination





- Vital Signs


Temperature: 97.4 F


Blood Pressure: 141/63


Pulse: 56


Respirations: 16


Pulse Ox (%): 100





- Physical Exam


General: Alert, Mild distress, Obese


HEENT: Atraumatic, PERRLA, EOMI


Neck: Supple, JVD not distended


Respiratory: Clear to auscultation bilaterally, Normal air movement


Cardiovascular: Regular rate/rhythm, Normal S1 S2


Gastrointestinal: Normal bowel sounds, No tenderness


Musculoskeletal: No tenderness


Integumentary: No rashes


Neurological: Normal speech, Normal tone, Normal affect


Lymphatics: No axilla or inguinal lymphadenopathy





- Studies


Medications List Reviewed: Yes





Assessment And Plan





- Current Problems (Diagnosis)


(1) Epigastric pain


Current Visit: Yes   Status: Acute   


Plan: 


MOSTLY FROM ACID, VS VIRAL SYNDROME 


RAISE PROTONIX IV BID. 





DC IN AM POSSIBLE. 





HER PAIN HAS LOW POSSIBILTY OF BEING CARDIAC IN ORIGIN. 











MILD TO MODERATE GASTRITIS


SHE IS ALREADY ON PROTONIX IV BID. 


SHE IS STABLE. 


I HAD WRITTEN DISCHARGE ORDERS WITH [facility]. 


DC PLAN PER DR. MICHELLE.








(2) GERD (gastroesophageal reflux disease)


Current Visit: Yes   Status: Acute   


Plan: 


IV PROTONIX


Qualifiers: 


   Esophagitis presence: with esophagitis   Qualified Code(s): K21.0 - Gastro-

esophageal reflux disease with esophagitis   





(3) Nausea & vomiting


Current Visit: No   Status: Acute   


Plan: 


SHOULD IMPROVE 


NO MECHANICAL ISSUES. 








(4) Diffuse abdominal pain


Current Visit: Yes   Status: Acute   


Plan: 


DIVERICULITIS POSSIBLE.


I TALKED TO DR CASILLAS TO RULE OUT DIVERTICULITIS. 


HE TOLD ME YES-  LOOKS LIKE LOTS OF DIVERTICULOSIS BUT NO DIVERTICULITIS. 


CELIAC AND MESENTERIC ARTERIES ARE GOOD.

## 2019-04-11 VITALS — OXYGEN SATURATION: 99 %

## 2019-04-11 VITALS — DIASTOLIC BLOOD PRESSURE: 68 MMHG | SYSTOLIC BLOOD PRESSURE: 142 MMHG | TEMPERATURE: 97.6 F

## 2019-04-11 PROCEDURE — 0DJD8ZZ INSPECTION OF LOWER INTESTINAL TRACT, VIA NATURAL OR ARTIFICIAL OPENING ENDOSCOPIC: ICD-10-PCS

## 2019-04-11 RX ADMIN — POTASSIUM CHLORIDE SCH MEQ: 10 TABLET, FILM COATED, EXTENDED RELEASE ORAL at 08:34

## 2019-04-11 RX ADMIN — HUMAN INSULIN SCH: 100 INJECTION, SOLUTION SUBCUTANEOUS at 07:30

## 2019-04-11 RX ADMIN — SODIUM CHLORIDE SCH MG: 0.9 INJECTION, SOLUTION INTRAVENOUS at 08:34

## 2019-04-11 RX ADMIN — Medication SCH: at 08:39

## 2019-04-11 RX ADMIN — HUMAN INSULIN SCH: 100 INJECTION, SOLUTION SUBCUTANEOUS at 11:30

## 2019-04-11 RX ADMIN — SODIUM CHLORIDE AND POTASSIUM CHLORIDE SCH MLS: .9; .15 SOLUTION INTRAVENOUS at 03:43

## 2019-04-11 RX ADMIN — PANTOPRAZOLE SODIUM SCH MG: 40 TABLET, DELAYED RELEASE ORAL at 08:34

## 2019-04-11 RX ADMIN — HUMAN INSULIN SCH: 100 INJECTION, SOLUTION SUBCUTANEOUS at 16:30

## 2019-04-11 RX ADMIN — METOCLOPRAMIDE SCH MG: 5 INJECTION, SOLUTION INTRAMUSCULAR; INTRAVENOUS at 00:36

## 2019-04-11 RX ADMIN — SODIUM CHLORIDE AND POTASSIUM CHLORIDE SCH: .9; .15 SOLUTION INTRAVENOUS at 15:00

## 2019-04-11 RX ADMIN — ASPIRIN SCH: 81 TABLET, COATED ORAL at 08:38

## 2019-04-11 RX ADMIN — CIPROFLOXACIN SCH MLS: 2 INJECTION, SOLUTION INTRAVENOUS at 08:37

## 2019-04-11 RX ADMIN — Medication SCH ML: at 08:38

## 2019-04-11 RX ADMIN — VALSARTAN SCH MG: 80 TABLET ORAL at 08:35

## 2019-04-11 RX ADMIN — METRONIDAZOLE SCH MLS: 500 INJECTION, SOLUTION INTRAVENOUS at 08:36

## 2019-04-11 NOTE — P.DS
Admission Date: 04/08/19


Discharge Date: 04/11/19


Disposition: ROUTINE DISCHARGE


Reason for Admission: DIFFUSE ABDOMEN PAIN.





- Problems


(1) Epigastric pain


Current Visit: Yes   Status: Acute   





(2) GERD (gastroesophageal reflux disease)


Current Visit: Yes   Status: Acute   


Qualifiers: 


   Esophagitis presence: with esophagitis   Qualified Code(s): K21.0 - Gastro-

esophageal reflux disease with esophagitis   





(3) Nausea & vomiting


Current Visit: No   Status: Acute   





(4) Diffuse abdominal pain


Current Visit: Yes   Status: Acute   


Brief History of Present Illness: 





MS. ZULETA HAS BURNING CHEST PAIN RADIATING TO BACK FOR TWO DAYS. SHE CALLED DR. MICHELLE HER GI DOCTOR BUT HE HAS QUIT TAKING HER INSURANCE.  SHE COMES TO ER.  

SHE HAS SOME NAUSEA AND VOMITING ABOUT 3 TIMES IN TWO DAYS.  





MS ZULETA IS LOT BETTER.  SHE IS NOT TAKING PROTONIX SHE HAS BUT NOW SHE WILL.  

I STOPPED KCL AND ADDED TO SPIRONOLACTONE AS KCL CAN IRRITANTE THE STOMACH. SHE 

IS STABLE TO GO HOME.


Vital Signs/Physical Exam: 














Temp Pulse Resp BP Pulse Ox


 


 98.8 F   64   18   150/45 H  98 


 


 04/11/19 12:34  04/11/19 12:34  04/11/19 12:34  04/11/19 12:34  04/11/19 12:00








Laboratory Data at Discharge: 














WBC  7.3 K/uL (4.3-10.9)  D 04/09/19  04:29    


 


Hgb  11.8 g/dL (12.0-15.0)  L  04/09/19  04:29    


 


Hct  36.1 % (36.0-45.0)   04/09/19  04:29    


 


Plt Count  242 K/uL (152-406)   04/09/19  04:29    


 


PT  13.2 SECONDS (9.5-12.5)  H  04/08/19  14:20    


 


INR  1.12   04/08/19  14:20    


 


Sodium  145 mmol/L (136-145)   04/09/19  04:29    


 


Potassium  3.6 mmol/L (3.5-5.1)   04/09/19  04:29    


 


BUN  14 mg/dL (7-18)   04/09/19  04:29    


 


Creatinine  0.68 mg/dL (0.55-1.3)   04/09/19  04:29    


 


Glucose  151 mg/dL ()  H  04/09/19  04:29    


 


Magnesium  1.8 mg/dL (1.8-2.4)   04/08/19  14:20    


 


Total Bilirubin  0.4 mg/dL (0.2-1.0)   04/08/19  14:20    


 


AST  13 U/L (15-37)  L  04/08/19  14:20    


 


ALT  21 U/L (12-78)   04/08/19  14:20    


 


Alkaline Phosphatase  107 U/L ()   04/08/19  14:20    


 


Troponin I  < 0.02 ng/mL (0.0-0.045)   04/08/19  23:49    


 


Lipase  158 U/L ()   04/08/19  14:20    








Home Medications: 








Atenolol 1 tab PO BEDTIME 04/15/16 


Nisoldipine 1 tab PO DAILY 04/15/16 


Olmesartan/Hydrochlorothiazide [Benicar Hct 40-12.5 mg Tablet] 1 tab PO DAILY 04

/15/16 


Pantoprazole Sodium [Protonix] 40 mg PO DAILY 04/09/19 


Ciprofloxacin HCl [Cipro 500 MG Tablet] 500 mg PO BID #20 tab 04/10/19 


metroNIDAZOLE [Flagyl] 500 mg PO Q8H #30 tablet 04/10/19 


Pantoprazole [Protonix Tab*] 40 mg PO DAILY #90 tab 04/11/19 


Spironolactone [Aldactone*] 25 mg PO DAILY #90 tab 04/11/19 





New Medications: 


Ciprofloxacin HCl [Cipro 500 MG Tablet] 500 mg PO BID #20 tab


metroNIDAZOLE [Flagyl] 500 mg PO Q8H #30 tablet


Pantoprazole [Protonix Tab*] 40 mg PO DAILY #90 tab


Spironolactone [Aldactone*] 25 mg PO DAILY #90 tab


Followup: 


AdrianaJason MD [ASSOCIATE-ACTIVE - CAN ADMIT] -

## 2019-04-11 NOTE — ENDO RPT
[address], [address]





COLONOSCOPY PROCEDURE REPORT     EXAM DATE: 04/11/2019



PATIENT NAME:      Liane Burk           MR #:      [id number]

YOB: 1955      VISIT #:     [id number]

ATTENDING:     Jason Wright Dr     STATUS:     outpatient

ASSISTANT:      Maureen Leblanc RN and Kathleen Ulrich



INDICATIONS:  The patient is a 64 yr old Female here for a colonoscopy due to

abdominal pain and family history of colon cancer

PROCEDURE PERFORMED:     Colonoscopy

MEDICATIONS:     Per Anesthesia.

ESTIMATED BLOOD LOSS:     None



CONSENT: The patient understands the risks and benefits of the procedure and

understands that these risks include, but are not limited to: sedation,

allergic reaction, infection, perforation and/or bleeding. Alternative means of

evaluation and treatment include, among others: physical exam, x-rays, and/or

surgical intervention. The patient elects to proceed with this endoscopic

procedure.



DESCRIPTION OF PROCEDURE:  During intra-op preparation period all mechanical 

medical equipment was checked for proper function. Hand hygiene and appropriate

measures for infection prevention was taken.  Procedure, possible

complications, alternatives including, but not limited to possibility of

bleeding, perforation, tear, infection, sepsis, need for surgery, need for

blood transfusion, were explained to the patient.  After the risks, benefits

and alternatives of the procedure were thoroughly explained, Informed consent

was verified, confirmed and timeout was successfully executed by the treatment

team.  The patient was placed in the left lateral position.   A digital rectal

exam was performed and revealed no abnormalities of the rectum.  After

appropriate level of anesthesia, the scope was passed.  The [id number] ([id number])

endoscope was introduced through the anus and advanced to the proximal

transverse colon. The quality of the prep was fair. The instrument was then

slowly withdrawn as the colon was fully examined.  Scope withdrawal time was 7

minutes.



COLON FINDINGS: Moderate diverticulosis was noted throughout the entire examined

colon.  No bleeding was noted from the diverticulosis.   Small internal

hemorrhoids were found.  Retroflexed views revealed small hemorrhoids. The

scope was then completely withdrawn from the patient and the procedure

terminated.







ADVERSE EVENTS:      There were no complications.

IMPRESSIONS:     1.  Moderate diverticulosis throughout the entire examined

colon, predominantly in the sigmoid colon

2.  Small internal hemorrhoids

3.  Intubation to proximal transverse colon, stopping there due to probability

of LLQ/RLQ pain being due to mild diverticulitis (could worsen from pressure of



on IV antibiotics



RECOMMENDATIONS:     continue IV antibiotics

RECALL:     Return in 6 week(s) for Colonoscopy.



_____________________________

Jason Wright Dr

eSigned:  Jason Wright Dr 04/11/2019 12:21 PM





cc:  Erickson Cano



CPT CODES:

ICD9 CODES:





PATIENT NAME:  Liane Burk

MR#: [id number]

## 2021-10-18 ENCOUNTER — HOSPITAL ENCOUNTER (EMERGENCY)
Dept: HOSPITAL 97 - ER | Age: 66
Discharge: HOME | End: 2021-10-18
Payer: COMMERCIAL

## 2021-10-18 VITALS — DIASTOLIC BLOOD PRESSURE: 95 MMHG | SYSTOLIC BLOOD PRESSURE: 164 MMHG

## 2021-10-18 VITALS — TEMPERATURE: 98.5 F | OXYGEN SATURATION: 100 %

## 2021-10-18 DIAGNOSIS — I10: ICD-10-CM

## 2021-10-18 DIAGNOSIS — E11.9: ICD-10-CM

## 2021-10-18 DIAGNOSIS — Z88.0: ICD-10-CM

## 2021-10-18 DIAGNOSIS — L03.213: Primary | ICD-10-CM

## 2021-10-18 LAB
ALBUMIN SERPL BCP-MCNC: 3.6 G/DL (ref 3.4–5)
ALP SERPL-CCNC: 94 U/L (ref 45–117)
ALT SERPL W P-5'-P-CCNC: 19 U/L (ref 12–78)
AST SERPL W P-5'-P-CCNC: 10 U/L (ref 15–37)
BUN BLD-MCNC: 21 MG/DL (ref 7–18)
GLUCOSE SERPLBLD-MCNC: 187 MG/DL (ref 74–106)
HCT VFR BLD CALC: 38.6 % (ref 36–45)
LYMPHOCYTES # SPEC AUTO: 1.4 K/UL (ref 0.7–4.9)
PMV BLD: 8.6 FL (ref 7.6–11.3)
POTASSIUM SERPL-SCNC: 3.7 MMOL/L (ref 3.5–5.1)
RBC # BLD: 4.56 M/UL (ref 3.86–4.86)

## 2021-10-18 PROCEDURE — 85025 COMPLETE CBC W/AUTO DIFF WBC: CPT

## 2021-10-18 PROCEDURE — 99284 EMERGENCY DEPT VISIT MOD MDM: CPT

## 2021-10-18 PROCEDURE — 36415 COLL VENOUS BLD VENIPUNCTURE: CPT

## 2021-10-18 PROCEDURE — 80048 BASIC METABOLIC PNL TOTAL CA: CPT

## 2021-10-18 PROCEDURE — 70487 CT MAXILLOFACIAL W/DYE: CPT

## 2021-10-18 PROCEDURE — 80076 HEPATIC FUNCTION PANEL: CPT

## 2021-10-18 NOTE — RAD REPORT
EXAM DESCRIPTION:  CT - FC

 

CLINICAL HISTORY:  orbital infection vs sinus

 

COMPARISON:  No comparisons

 

TECHNIQUE:  Axial 2 mm thick images of the face were obtained with sagittal and coronal reconstructio
n images.

 

All CT scans are performed using dose optimization technique as appropriate and may include automated
 exposure control or mA/KV adjustment according to patient size.

 

FINDINGS:  No acute facial bone fracture is seen.The mandible is intact.

 

Preseptal edema involving the left orbit. No fluid collections identified. No soft tissue gas. The in
traconal soft tissues are unremarkable.The paranasal sinuses and mastoids are clear.

 

 

IMPRESSION:  Preseptal edema anterior to the left orbit which may represent a cellulitis. No other ac
patt findings are identified.

## 2021-10-18 NOTE — EDPHYS
Physician Documentation                                                                           

 [facility]                                                                 

Name: Liane Burk                                                                               

Age: 66 yrs                                                                                       

Sex: Female                                                                                       

: 1955                                                                                   

MRN: [id number]                                                                                   

Arrival Date: 10/18/2021                                                                          

Time: 07:09                                                                                       

Account#: [id number]                                                                            

Bed 23                                                                                            

Private MD:                                                                                       

ED Physician Nirali Barnes                                                                         

HPI:                                                                                              

10/18                                                                                             

07:41 This 66 yrs old Black Female presents to ER via Ambulatory with complaints of Cough,    sp3 

      Sinus Congestion, Eye Swelling, Ear Pain, Blurred Vision.                                   

07:41 Is a 6-year-old female with a history of hypertension presents with chief complaint     sp3 

      left eye pain, swelling, mild itching for the last 2 days. Patient is also recovering       

      from an upper respiratory infection for which she saw her PCP and had a negative            

      COVID-19 test was placed on azithromycin standard pack for which she is on the last day     

      today. Patient reports mild resolution of symptoms but presents today mainly for the        

      left eye pain. She states that it could "be a bug bite" but does not distinctly             

      remember an insect bite. She also states that she has had sinus infections in the past      

      and feels like the pain may be sinus related. She denies any supratentorial headache,       

      neck pain, fever, shortness of breath, chest pain, abdominal pain, nausea, vomiting,        

      diarrhea, rash anywhere else, history of shingles, neuro symptoms, or any other             

      symptoms on ROS at this time. Remainder of ROS is negative..                                

                                                                                                  

Historical:                                                                                       

- Allergies:                                                                                      

07:20 PENICILLINS;                                                                            aa5 

- Home Meds:                                                                                      

07:20 Singulair Oral [Active]; metformin 1,000 mg oral tab 2 times per day [Active]; Bromfed  aa5 

      DM oral [Active]; Xyzal oral [Active]; Azithromycin Oral [Active]; Atenolol Oral            

      [Active]; spironolactone 25 mg Oral tab once daily [Active];                                

      olmesartan-hydrochlorothiazide 40-12.5 mg oral tab once daily [Active]; nisoldipine 34      

      mg oral Tb24 once daily [Active];                                                           

- PMHx:                                                                                           

07:20 Hypertension; Diabetes mellitus; Asthma;                                                aa5 

                                                                                                  

- Immunization history:: Client reports receiving the 2nd dose of the Covid vaccine.              

- Social history:: Smoking status: Patient denies any tobacco usage or history of.                

                                                                                                  

                                                                                                  

ROS:                                                                                              

07:43 Constitutional: Negative for fever, chills, and weight loss, Neck: Negative for injury, sp3 

      pain, and swelling, Cardiovascular: Negative for chest pain, palpitations, and edema,       

      Abdomen/GI: Negative for abdominal pain, nausea, vomiting, diarrhea, and constipation,      

      Back: Negative for injury and pain, Skin: Negative for injury, rash, and discoloration,     

      Neuro: Negative for headache, weakness, numbness, tingling, and seizure, Psych:             

      Negative for depression, anxiety, suicide ideation, homicidal ideation, and                 

      hallucinations, Allergy/Immunology: Negative for hives, rash, and allergies.                

07:43 All other systems are negative.                                                             

                                                                                                  

Exam:                                                                                             

07:44 Constitutional:  This is a well developed, well nourished patient who is awake, alert,  sp3 

      and in no acute distress. Head/Face:  Normocephalic, atraumatic. Neck:  Trachea             

      midline, no thyromegaly or masses palpated, and no cervical lymphadenopathy.  Supple,       

      full range of motion without nuchal rigidity, or vertebral point tenderness.  No            

      Meningismus. Chest/axilla:  Normal chest wall appearance and motion.  Nontender with no     

      deformity.  No lesions are appreciated. Cardiovascular:  Regular rate and rhythm with a     

      normal S1 and S2.  No gallops, murmurs, or rubs.  Normal PMI, no JVD.  No pulse             

      deficits. Respiratory:  Lungs have equal breath sounds bilaterally, clear to                

      auscultation and percussion.  No rales, rhonchi or wheezes noted.  No increased work of     

      breathing, no retractions or nasal flaring. Abdomen/GI:  Soft, non-tender, with normal      

      bowel sounds.  No distension or tympany.  No guarding or rebound.  No evidence of           

      tenderness throughout. Back:  No spinal tenderness.  No costovertebral tenderness.          

      Full range of motion. Neuro:  Awake and alert, GCS 15, oriented to person, place, time,     

      and situation.  Cranial nerves II-XII grossly intact.  Motor strength 5/5 in all            

      extremities.  Sensory grossly intact.  Cerebellar exam normal.  Normal gait. Psych:         

      Awake, alert, with orientation to person, place and time.  Behavior, mood, and affect       

      are within normal limits.                                                                   

07:44 Eyes: Patient has left periorbital swelling mainly at the eyebrow level with a small 1      

      cm x 1 cm area of crusting in the larger area of swelling with approximately 50%            

      forcing of eye closure.  Pupillary exam, visual acuity, and extraocular muscles are all     

      intact..                                                                                    

07:44 ENT: Normal ENT exam with exception of slightly erythematous left tympanic membrane.        

      There are no signs of shingles or any other cranial nerve distribution rash..               

                                                                                                  

Vital Signs:                                                                                      

07:20  / 77; Pulse 80; Resp 20 S; Temp 98.5(O); Pulse Ox 100% on R/A; Weight 104.33 kg  aa5 

      (R); Height 5 ft. 1 in. (154.94 cm) (R);                                                    

08:14  / 89; Pulse 69; Resp 17; Pulse Ox 100% on R/A;                                   ap3 

09:29  / 95; Pulse 64; Resp 17; Pulse Ox 100% on R/A;                                   ap3 

07:20 Body Mass Index 43.46 (104.33 kg, 154.94 cm)                                            aa5 

                                                                                                  

MDM:                                                                                              

07:24 Patient medically screened.                                                             sp3 

07:45 Data reviewed: vital signs, nurses notes. ED course: Patient likely has a periorbital   sp3 

      cellulitis with no orbital involvement. Will obtain a CT scan to fully assess. I am not     

      suspicious of pneumonia regarding her URI symptoms as her lungs are completely clear        

      and she is having no active coughing. Patient will likely need to be placed on a            

      secondary antibiotic given her facial skin symptoms. Clinically she does not have           

      shingles..                                                                                  

09:39 ED course: Laboratory values are normal and CT demonstrates preseptal cellulitis        sp3 

      without any other findings. Will discharge patient home on Bactrim and clindamycin at       

      this time..                                                                                 

                                                                                                  

10/18                                                                                             

07:40 Order name: Basic Metabolic Panel                                                       sp3 

10/18                                                                                             

07:40 Order name: CBC with Diff                                                               sp3 

10/18                                                                                             

07:40 Order name: Hepatic Function                                                            sp3 

10/18                                                                                             

07:41 Order name: Basic Metabolic Panel; Complete Time: 09:38                                 EDMS

10/18                                                                                             

07:41 Order name: CBC with Automated Diff; Complete Time: 09:38                               EDMS

10/18                                                                                             

07:41 Order name: Liver (Hepatic) Function; Complete Time: 09:38                              EDMS

10/18                                                                                             

07:40 Order name: IV Saline Lock; Complete Time: 07:53                                        sp3 

10/18                                                                                             

07:40 Order name: Labs collected and sent; Complete Time: 07:53                               sp3 

10/18                                                                                             

07:40 Order name: CT Maxillofacial W/cont; Complete Time: 09:38                               sp3 

                                                                                                  

Administered Medications:                                                                         

No medications were administered                                                                  

                                                                                                  

                                                                                                  

Disposition Summary:                                                                              

10/18/21 09:41                                                                                    

Discharge Ordered                                                                                 

      Location: Home                                                                          sp3 

      Condition: Stable                                                                       sp3 

      Diagnosis                                                                                   

        - Periorbital cellulitis                                                              sp3 

      Followup:                                                                               sp3 

        - With: Private Physician                                                                  

        - When: As needed                                                                          

        - Reason:                                                                                  

      Discharge Instructions:                                                                     

        - Discharge Summary Sheet                                                             sp3 

        - Preseptal Cellulitis, Adult                                                         sp3 

      Forms:                                                                                      

        - Medication Reconciliation Form                                                      sp3 

        - Work release form                                                                   ap3 

        - Thank You Letter                                                                    sp3 

        - Antibiotic Education                                                                sp3 

        - Prescription Opioid Use                                                             sp3 

      Prescriptions:                                                                              

        - Clindamycin HCl 300 mg Oral Capsule                                                      

            - take 1 capsule by ORAL route every 6 hours for 10 days; 40 capsule; Refills: 0, sp3 

      Product Selection Permitted                                                                 

        - Bactrim -160 mg Oral Tablet                                                        

            - take 1 tablet by ORAL route every 12 hours for 10 days; 20 tablet; Refills: 0,  sp3 

      Product Selection Permitted                                                                 

Signatures:                                                                                       

Dispatcher [employer]                           Donna Mercer RN RN   aa5                                                  

Amarilys Givens RN                    RN   ap3                                                  

Nirali Barnes MD MD   sp3                                                  

                                                                                                  

**************************************************************************************************

## 2021-10-18 NOTE — ER
Nurse's Notes                                                                                     

 [facility]                                                                 

Name: Liane Burk                                                                               

Age: 66 yrs                                                                                       

Sex: Female                                                                                       

: 1955                                                                                   

MRN: [id number]                                                                                   

Arrival Date: 10/18/2021                                                                          

Time: 07:09                                                                                       

Account#: [id number]                                                                            

Bed 23                                                                                            

Private MD:                                                                                       

Diagnosis: Periorbital cellulitis                                                                 

                                                                                                  

Presentation:                                                                                     

10/18                                                                                             

07:20 Chief complaint: Patient states: "I started with a cough last week and I went to the    aa5 

      doctor and he put me on azithromycin, Bromfed/codeine, and xyzal". Pt reports she is        

      coughing up phlegm and has hx of bronchitis. Pt also reports negative COVID-19 test         

      last week. Pt c/o sinus congestion, left ear pain, and left upper eyelid swelling.          

07:20 Acuity: ROGELIO 3                                                                           aa5 

07:20 Coronavirus screen: cough unrelated to allergies. Ebola Screen: No symptoms or risks    aa5 

      identified at this time. Onset of symptoms was 2021.                                

07:20 Method Of Arrival: Ambulatory                                                           aa5 

07:29 Risk Assessment: Do you want to hurt yourself or someone else?.                         ap3 

08:27 Initial Sepsis Screen: Does the patient meet any 2 criteria? No. Patient's initial      ap3 

      sepsis screen is negative. Does the patient have a suspected source of infection? No.       

      Patient's initial sepsis screen is negative.                                                

                                                                                                  

Historical:                                                                                       

- Allergies:                                                                                      

07:20 PENICILLINS;                                                                            aa5 

- Home Meds:                                                                                      

07:20 Singulair Oral [Active]; metformin 1,000 mg oral tab 2 times per day [Active]; Bromfed  aa5 

      DM oral [Active]; Xyzal oral [Active]; Azithromycin Oral [Active]; Atenolol Oral            

      [Active]; spironolactone 25 mg Oral tab once daily [Active];                                

      olmesartan-hydrochlorothiazide 40-12.5 mg oral tab once daily [Active]; nisoldipine 34      

      mg oral Tb24 once daily [Active];                                                           

- PMHx:                                                                                           

07:20 Hypertension; Diabetes mellitus; Asthma;                                                aa5 

                                                                                                  

- Immunization history:: Client reports receiving the 2nd dose of the Covid vaccine.              

- Social history:: Smoking status: Patient denies any tobacco usage or history of.                

                                                                                                  

                                                                                                  

Screenin:29 Abuse screen: Denies threats or abuse. Nutritional screening: No deficits noted.        ap3 

      Tuberculosis screening: No symptoms or risk factors identified. Fall Risk None              

      identified.                                                                                 

                                                                                                  

Assessment:                                                                                       

07:26 General: Appears comfortable, Behavior is calm, cooperative, Reports feeling ill for >  ap3 

      3 days. Pain: Complains of pain in left ear, left eye and nose Quality of pain is           

      described as pressure, Also complains of cough and congestion. Neuro: Level of              

      Consciousness is awake, alert, obeys commands, Oriented to person, place, time,             

      situation, Moves all extremities. Gait is steady, Speech is normal. Cardiovascular:         

      Patient's skin is warm and dry. Respiratory: Reports cough that is productive, Airway       

      is patent Respiratory effort is even, unlabored, Respiratory pattern is regular,            

      symmetrical. GI: No signs and/or symptoms were reported involving the gastrointestinal      

      system. : No signs and/or symptoms were reported regarding the genitourinary system.      

      EENT: Eyes left eye swollen. Reports nasal congestion nasal discharge. Derm:.               

      Musculoskeletal: Swelling present in left eye.                                              

08:14 Reassessment: Patient and/or family updated on plan of care and expected duration. Pain ap3 

      level reassessed. Patient is alert, oriented x 3, equal unlabored respirations, skin        

      warm/dry/pink. visitor at the bedside.                                                      

                                                                                                  

Vital Signs:                                                                                      

07:20  / 77; Pulse 80; Resp 20 S; Temp 98.5(O); Pulse Ox 100% on R/A; Weight 104.33 kg  aa5 

      (R); Height 5 ft. 1 in. (154.94 cm) (R);                                                    

08:14  / 89; Pulse 69; Resp 17; Pulse Ox 100% on R/A;                                   ap3 

09:29  / 95; Pulse 64; Resp 17; Pulse Ox 100% on R/A;                                   ap3 

07:20 Body Mass Index 43.46 (104.33 kg, 154.94 cm)                                            aa5 

                                                                                                  

ED Course:                                                                                        

07:09 Patient arrived in ED.                                                                  bp1 

07:20 Nirali Barnes MD is Attending Physician.                                                sp3 

07:20 Arm band placed on Patient placed in an exam room, on a stretcher.                      aa5 

07:25 Amarilys Givens, RN is Primary Nurse.                                                  ap3 

07:28 Triage completed.                                                                       aa5 

07:28 Patient has correct armband on for positive identification. Bed in low position. Call   ap3 

      light in reach. Side rails up X2. Pulse ox on. NIBP on. Door closed. Noise minimized.       

07:48 Inserted saline lock: 20 gauge in right antecubital area, using aseptic technique.      ap3 

      Blood collected.                                                                            

07:53 Warm blanket given.                                                                     ap3 

08:28 Patient moved to CT via wheelchair.                                                     ap3 

08:37 CT Maxillofacial W/cont In Process Unspecified.                                         EDMS

08:41 Patient moved back from CT.                                                             ap3 

09:08 Basic Metabolic Panel Sent.                                                             mh5 

09:08 CBC with Diff Sent.                                                                     mh5 

09:08 Hepatic Function Sent.                                                                  mh5 

10:25 No provider procedures requiring assistance completed. IV discontinued, intact,         ap3 

      bleeding controlled, No redness/swelling at site. Pressure dressing applied.                

                                                                                                  

Administered Medications:                                                                         

No medications were administered                                                                  

                                                                                                  

                                                                                                  

Outcome:                                                                                          

:41 Discharge ordered by MD.                                                                sp3 

10:25 Discharged to home ambulatory.                                                          ap3 

10:25 Condition: good                                                                             

10:25 Discharge instructions given to patient, family, Instructed on discharge instructions,      

      follow up and referral plans. medication usage, Demonstrated understanding of               

      instructions, follow-up care, medications, Prescriptions given X 2.                         

10:25 Patient left the ED.                                                                    ap3 

                                                                                                  

Signatures:                                                                                       

Dispatcher [employer]                           EDMS                                                 

Donna Conway, RN                     RN   [email or web address]                                                  

Lisset Perez Amanda, RN RN   ap3                                                  

Mini Lopez Setul, MD MD   sp3                                                  

                                                                                                  

**************************************************************************************************

## 2022-09-28 NOTE — P.HP
Certification for Inpatient


Patient admitted to: Observation


With expected LOS: <2 Midnights


Practitioner: I am a practitioner with admitting privileges, knowledge of 

patient current condition, hospital course, and medical plan of care.


Services: Services provided to patient in accordance with Admission 

requirements found in Title 42 Section 412.3 of the Code of Federal Regulations





Patient History


Date of Service: 04/08/19


Reason for admission: BURNING PAIN RADIATING TO BACK


History of Present Illness: 





MS. ZULETA HAS BURNING CHEST PAIN RADIATING TO BACK FOR TWO DAYS. SHE CALLED DR. MICHELLE HER GI DOCTOR BUT HE HAS QUIT TAKING HER INSURANCE.  SHE COMES TO ER.  

SHE HAS SOME NAUSEA AND VOMITING ABOUT 3 TIMES IN TWO DAYS.  


Allergies





Penicillins Allergy (Severe, Verified 02/20/12 08:21)


 airway obstruction





Home Medications: 








Atenolol 1 tab PO BEDTIME 04/15/16 


Nisoldipine 1 tab PO DAILY 04/15/16 


Olmesartan/Hydrochlorothiazide [Benicar Hct 40-12.5 mg Tablet] 1 tab PO DAILY 04

/15/16 


Tramadol HCl 1 tab PO BID 04/15/16 


Potassium Oral Tab [Klor-Con 10 mEq Tab*] 10 meq PO DAILY 90 Days  tab 04/18/16 








- Past Medical/Surgical History


Diabetic: No


-: HTN


-: lap Kayla


-: cyst removed from (L) breast


-: hysterectomy





- Family History


  ** Mother


-: Hypertension, Diabetes, Stroke





  ** Father


-: Heart disease





  ** Sister


-: Heart disease, Diabetes





  ** Brother


-: Heart disease, Cancer





- Social History


Alcohol use: No


CD- Drugs: No


Caffeine use: Yes





Review of Systems


10-point ROS is otherwise unremarkable


Gastrointestinal: Nausea, Vomiting, No Distention





Physical Examination





- Physical Exam


General: Alert, Mild distress, Obese


HEENT: Atraumatic, PERRLA, Mucous membr. moist/pink, EOMI, Sclerae nonicteric


Neck: Supple, 2+ carotid pulse no bruit, No LAD, Without JVD or thyroid 

abnormality


Respiratory: Clear to auscultation bilaterally, Normal air movement


Cardiovascular: Regular rate/rhythm, Normal S1 S2


Gastrointestinal: Normal bowel sounds, Tenderness (EPIG MILD)


Musculoskeletal: No tenderness


Integumentary: No rashes


Neurological: Normal gait, Normal speech, Normal strength at 5/5 x4 extr, 

Normal tone, Normal affect


Lymphatics: No axilla or inguinal lymphadenopathy





- Studies


Laboratory Data (last 24 hrs)





04/08/19 14:20: Lipase 158


04/08/19 14:20: PT 13.2 H, INR 1.12


04/08/19 14:20: WBC 10.3, Hgb 13.8, Hct 40.7, Plt Count 299


04/08/19 14:20: Sodium 139, Potassium 3.0 L, BUN 19 H, Creatinine 0.98, Glucose 

330 H, Magnesium 1.8, Total Bilirubin 0.4, AST 13 L, ALT 21, Alkaline 

Phosphatase 107








Assessment and Plan





- Problems (Diagnosis)


(1) Epigastric pain


Current Visit: Yes   Status: Acute   


Plan: 


MOSTLY FROM ACID, VS VIRAL SYNDROME 


RAISE PROTONIX IV BID. 





DC IN AM POSSIBLE. 





HER PAIN HAS LOW POSSIBILTY OF BEING CARDIAC IN ORIGIN. 














(2) GERD (gastroesophageal reflux disease)


Current Visit: Yes   Status: Acute   


Plan: 


IV PROTONIX


Qualifiers: 


   Esophagitis presence: with esophagitis   Qualified Code(s): K21.0 - Gastro-

esophageal reflux disease with esophagitis   





(3) Nausea & vomiting


Current Visit: No   Status: Acute   


Plan: 


SHOULD IMPROVE 


NO MECHANICAL ISSUES. 








- Advance Directives


Does patient have a Living Will: No


Does patient have a Durable POA for Healthcare: No Cephalic

## 2024-08-13 ENCOUNTER — HOSPITAL ENCOUNTER (EMERGENCY)
Dept: HOSPITAL 97 - ER | Age: 69
Discharge: HOME | End: 2024-08-13
Payer: COMMERCIAL

## 2024-08-13 VITALS — DIASTOLIC BLOOD PRESSURE: 74 MMHG | SYSTOLIC BLOOD PRESSURE: 130 MMHG

## 2024-08-13 VITALS — OXYGEN SATURATION: 100 % | TEMPERATURE: 98.7 F

## 2024-08-13 DIAGNOSIS — R10.32: Primary | ICD-10-CM

## 2024-08-13 DIAGNOSIS — R11.10: ICD-10-CM

## 2024-08-13 DIAGNOSIS — I10: ICD-10-CM

## 2024-08-13 DIAGNOSIS — E11.9: ICD-10-CM

## 2024-08-13 LAB
ALBUMIN SERPL BCP-MCNC: 3.3 G/DL (ref 3.4–5)
ALBUMIN/GLOB SERPL: 0.9 {RATIO} (ref 1.1–1.8)
ALP SERPL-CCNC: 83 U/L (ref 45–117)
ALT SERPL W P-5'-P-CCNC: 17 U/L (ref 13–56)
ANION GAP SERPL CALC-SCNC: 10.1 MEQ/L (ref 5–15)
AST SERPL W P-5'-P-CCNC: < 10 U/L (ref 15–37)
BUN BLD-MCNC: 38 MG/DL (ref 7–18)
GLOBULIN SER CALC-MCNC: 3.8 G/DL (ref 2.3–3.5)
GLUCOSE SERPLBLD-MCNC: 238 MG/DL (ref 74–106)
HCT VFR BLD CALC: 35.9 % (ref 36–45)
HGB BLD-MCNC: 11.9 G/DL (ref 12–15)
LIPASE SERPL-CCNC: 57 U/L (ref 13–75)
LYMPHOCYTES # SPEC AUTO: 1.6 K/UL (ref 0.7–4.9)
MCH RBC QN AUTO: 27.9 PG (ref 27–35)
MCHC RBC AUTO-ENTMCNC: 33.1 G/DL (ref 32–36)
MCV RBC: 84.3 FL (ref 80–100)
NRBC # BLD: 0 10*3/UL (ref 0–0)
NRBC BLD AUTO-RTO: 0 % (ref 0–0)
PMV BLD: 8.6 FL (ref 7.6–11.3)
POTASSIUM SERPL-SCNC: 4.1 MEQ/L (ref 3.5–5.1)
RBC # BLD: 4.26 M/UL (ref 3.86–4.86)
SQUAMOUS URNS QL MICRO: <5 /HPF
UA COMPLETE W REFLEX CULTURE PNL UR: (no result)
UA DIPSTICK W REFLEX MICRO PNL UR: (no result)
WBC # BLD AUTO: 7.7 THOU/UL (ref 4.3–10.9)

## 2024-08-13 PROCEDURE — 36415 COLL VENOUS BLD VENIPUNCTURE: CPT

## 2024-08-13 PROCEDURE — 74177 CT ABD & PELVIS W/CONTRAST: CPT

## 2024-08-13 PROCEDURE — 85025 COMPLETE CBC W/AUTO DIFF WBC: CPT

## 2024-08-13 PROCEDURE — 83605 ASSAY OF LACTIC ACID: CPT

## 2024-08-13 PROCEDURE — 81001 URINALYSIS AUTO W/SCOPE: CPT

## 2024-08-13 PROCEDURE — 83690 ASSAY OF LIPASE: CPT

## 2024-08-13 PROCEDURE — 80053 COMPREHEN METABOLIC PANEL: CPT

## 2024-08-13 NOTE — EDPHYS
Physician Documentation                                                                           

 [facility]                                                                 

Name: Liane Burk                                                                               

Age: 69 yrs                                                                                       

Sex: Female                                                                                       

: 1955                                                                                   

MRN: [id number]                                                                                   

Arrival Date: 2024                                                                          

Time: 08:41                                                                                       

Account#: [id number]                                                                            

Bed 6                                                                                             

Private MD:                                                                                       

ED Physician Nirali Barnes                                                                         

HPI:                                                                                              

                                                                                             

09:10 This 69 yrs old Black Female presents to ER via Wheelchair with complaints of Abdominal sp3 

      Pain.                                                                                       

09:10 69-year-old female with history of diabetes and hypertension status post                sp3 

      cholecystectomy, appendectomy, hysterectomy presents with left lower quadrant abdominal     

      pain. Patient also has a history of prior diverticulitis. She denies diarrhea but does      

      endorse vomiting. She denies fever, URI symptoms, neck pain, chest pain, shortness of       

      breath, back pain, extremity pain, numbness or tingling, history of vascular disease,       

      or any other signs or symptoms on ROS at this time..                                        

                                                                                                  

Historical:                                                                                       

- Allergies:                                                                                      

08:55 PENICILLINS;                                                                            mb9 

- PMHx:                                                                                           

08:55 Asthma; diabetes mellitus; Hypertension;                                                mb9 

                                                                                                  

- Immunization history:: Client reports receiving the 2nd dose of the Covid vaccine.              

- Infectious Disease History:: Denies.                                                            

- Social history:: Smoking status: unknown.                                                       

                                                                                                  

                                                                                                  

ROS:                                                                                              

09:11 Constitutional: Negative for fever, chills, and weight loss, Eyes: Negative for injury, sp3 

      pain, redness, and discharge, ENT: Negative for injury, pain, and discharge, Neck:          

      Negative for injury, pain, and swelling, Cardiovascular: Negative for chest pain,           

      palpitations, and edema, Respiratory: Negative for shortness of breath, cough,              

      wheezing, and pleuritic chest pain, Back: Negative for injury and pain, : Negative        

      for injury, bleeding, discharge, and swelling, MS/Extremity: Negative for injury and        

      deformity, Skin: Negative for injury, rash, and discoloration, Neuro: Negative for          

      headache, weakness, numbness, tingling, and seizure, Psych: Negative for depression,        

      anxiety, suicide ideation, homicidal ideation, and hallucinations, Allergy/Immunology:      

      Negative for hives, rash, and allergies, Endocrine: Negative for neck swelling,             

      polydipsia, polyuria, polyphagia, and marked weight changes, Hematologic/Lymphatic:         

      Negative for swollen nodes, abnormal bleeding, and unusual bruising,                        

09:11 All other systems are negative,                                                             

                                                                                                  

Exam:                                                                                             

09:11 Constitutional:  This is a well developed, well nourished patient who is awake, alert,  sp3 

      and in no acute distress. Head/Face:  Normocephalic, atraumatic. Eyes:  Pupils equal        

      round and reactive to light, extra-ocular motions intact.  Lids and lashes normal.          

      Conjunctiva and sclera are non-icteric and not injected.  Cornea within normal limits.      

      Periorbital areas with no swelling, redness, or edema. Neck:  Trachea midline, no           

      thyromegaly or masses palpated, and no cervical lymphadenopathy.  Supple, full range of     

      motion without nuchal rigidity, or vertebral point tenderness.  No Meningismus.             

      Chest/axilla:  Normal chest wall appearance and motion.  Nontender with no deformity.       

      No lesions are appreciated. Cardiovascular:  Regular rate and rhythm with a normal S1       

      and S2.  No gallops, murmurs, or rubs.  Normal PMI, no JVD.  No pulse deficits.             

      Respiratory:  Lungs have equal breath sounds bilaterally, clear to auscultation and         

      percussion.  No rales, rhonchi or wheezes noted.  No increased work of breathing, no        

      retractions or nasal flaring. Back:  No spinal tenderness.  No costovertebral               

      tenderness.  Full range of motion. Skin:  Warm, dry with normal turgor.  Normal color       

      with no rashes, no lesions, and no evidence of cellulitis. MS/ Extremity:  Pulses           

      equal, no cyanosis.  Neurovascular intact.  Full, normal range of motion. Neuro:  Awake     

      and alert, GCS 15, oriented to person, place, time, and situation.  Cranial nerves          

      II-XII grossly intact.  Motor strength 5/5 in all extremities.  Sensory grossly intact.     

       Cerebellar exam normal.  Normal gait. Psych:  Awake, alert, with orientation to            

      person, place and time.  Behavior, mood, and affect are within normal limits.               

09:11 Abdomen/GI: Left lower quadrant abdominal pain without peritoneal signs, rebound or         

      guarding.,                                                                                  

                                                                                                  

Vital Signs:                                                                                      

09:01  / 87; Pulse 80; Resp 17; Temp 98.7; Pulse Ox 100% ; Weight 122.47 kg; Height 5   ap3 

      ft. 1 in. ; Pain 8/10;                                                                      

10:25  / 70; Pulse 63; Resp 18; Pulse Ox 100% on R/A;                                   mb9 

11:30  / 74; Pulse 74; Resp 18; Pulse Ox 100% on R/A;                                   mb9 

09:01 Body Mass Index 51.02 (122.47 kg, 154.94 cm)                                            ap3 

09:01 Pain Scale: Adult                                                                       ap3 

                                                                                                  

MDM:                                                                                              

08:56 Patient medically screened.                                                             sp3 

09:12 Data reviewed: vital signs, nurses notes, lab test result(s), radiologic studies. ED    sp3 

      course: 69-year-old female with PMH above and past surgical history above now with left     

      lower quadrant abdominal pain. Differential diagnosis includes diverticulitis, colitis,     

      adhesions, SBO, partial SBO, ileus, UTI/pyelonephritis spectrum, and to a lesser degree     

      vascular pathology. Clinically I do not believe patient has sepsis or shock. Workup         

      will include laboratory values, UA, CT scan of the abdomen pelvis. Treatment will           

      include IV fluids and as needed pain medication as needed. Patient currently declines       

      pain medication. Disposition pending workup and patient course..                            

11:09 ED course: CT scan negative and laboratory values without significant findings.         sp3 

      Creatinine at 1.5. I have told her to hold her metformin till she sees Dr. Cano.           

      Patient did finally asked for pain medicine. We will give 4 mg of morphine and an           

      additional 4 mg of ondansetron IV. Discharged home after that as patient has no             

      critical illness. Vital signs are within normal limits. Patient in no acute distress        

      currently..                                                                                 

                                                                                                  

                                                                                             

09:06 Order name: CBC with Diff; Complete Time: 09:45                                         sp3 

                                                                                             

09:06 Order name: CMP; Complete Time: 09:45                                                   sp3 

                                                                                             

09:06 Order name: Lipase; Complete Time: 09:45                                                sp3 

                                                                                             

09:06 Order name: Urinalysis w/ reflexes; Complete Time: 10:53                                sp3 

                                                                                             

09:06 Order name: Lactate w/ 2H reflex if indic.; Complete Time: 10:36                        sp3 

                                                                                             

09:06 Order name: CT Abd/Pelvis - IV Contrast Only; Complete Time: 09:45                      sp3 

                                                                                             

09:06 Order name: IV Saline Lock; Complete Time: 09:11                                        sp3 

                                                                                             

09:06 Order name: Labs collected and sent; Complete Time: 09:11                               sp3 

                                                                                                  

Administered Medications:                                                                         

09:33 Drug: NS 0.9% IV 1000 ml IV at 1 bolus Per protocol; 1000 mL bolus Route: IV; Rate: 1   mb9 

      bolus; Site: right antecubital;                                                             

11:18 Follow up: Response: No adverse reaction; IV Status: Completed infusion                 mb9 

11:04 Drug: Ondansetron IVP 4 mg IVP once; over 2 minutes Route: IVP; Site: right antecubital;mb9 

11:08 Drug: morphine IVP or IV 4 mg IVP once over 4 mins Route: IVP; Infused Over: 4 mins;    mb9 

      Site: right antecubital;                                                                    

                                                                                                  

                                                                                                  

Disposition Summary:                                                                              

24 11:10                                                                                    

Discharge Ordered                                                                                 

 Notes:       Location: Home                                                                        
  sp3

      Condition: Stable                                                                       sp3 

      Diagnosis                                                                                   

        - Abdominal pain                                                                      sp3 

      Followup:                                                                               sp3 

        - With: Private Physician                                                                  

        - When: Upon discharge from the Emergency Department                                       

        - Reason: Continuance of care                                                              

      Discharge Instructions:                                                                     

        - Discharge Summary Sheet                                                             sp3 

        - Abdominal Pain, Adult                                                               sp3 

      Forms:                                                                                      

        - Medication Reconciliation Form                                                      sp3 

        - Antibiotic Education                                                                sp3 

        - Prescription Opioid Use                                                             sp3 

        - Patient Portal Instructions                                                         sp3 

        - Leadership Thank You Letter                                                         sp3 

Signatures:                                                                                       

Dispatcher [employer]                           Amarilys Perdomo RN                    RN   ap3                                                  

Nirali Barnes MD MD   sp3                                                  

Mallika Esposito RN                RN   mb9                                                  

                                                                                                  

**************************************************************************************************

## 2024-08-13 NOTE — ER
Nurse's Notes                                                                                     

 [facility]                                                                 

Name: Liane Burk                                                                               

Age: 69 yrs                                                                                       

Sex: Female                                                                                       

: 1955                                                                                   

MRN: [id number]                                                                                   

Arrival Date: 2024                                                                          

Time: 08:41                                                                                       

Account#: [id number]                                                                            

Bed 6                                                                                             

Private MD:                                                                                       

Diagnosis: Abdominal pain                                                                         

                                                                                                  

Presentation:                                                                                     

                                                                                             

09:01 Chief complaint: Patient states: she started having abdominal pain prior to , but ap3 

       she reports the pain got "really bad". Patient reports her pain is in the lower      

      left abdomen and radiates to the lower right. patient currently rates her pain as a         

      8/10 on the pain scale. patient reports nausea and intermittent constipation and            

      diarrhea. Coronavirus screen: At this time, the client does not indicate any symptoms       

      associated with coronavirus-19. Ebola Screen: No symptoms or risks identified at this       

      time. Initial Sepsis Screen: Does the patient meet any 2 criteria? No. Patient's            

      initial sepsis screen is negative. Does the patient have a suspected source of              

      infection? No. Patient's initial sepsis screen is negative. Risk Assessment: Do you         

      want to hurt yourself or someone else? Patient reports no desire to harm self or            

      others. Onset of symptoms is unknown.                                                       

09:01 Method Of Arrival: Wheelchair                                                           ap3 

09:01 Acuity: ROGELIO 3                                                                           ap3 

                                                                                                  

Triage Assessment:                                                                                

09:01 General: Appears uncomfortable, Behavior is cooperative, crying. Pain: Complains of     ap3 

      pain in left lower quadrant Pain radiates to suprapubic area and right lower quadrant       

      Pain currently is 8 out of 10 on a pain scale. Neuro: Level of Consciousness is awake,      

      alert, obeys commands, Oriented to person, place, time, situation, Appropriate for age      

      Speech is normal. Cardiovascular: Patient's skin is warm and dry. Respiratory: Airway       

      is patent Respiratory effort is even, unlabored, Respiratory pattern is regular,            

      symmetrical. GI: Reports lower abdominal pain, constipation, diarrhea, nausea.              

                                                                                                  

Historical:                                                                                       

- Allergies:                                                                                      

08:55 PENICILLINS;                                                                            mb9 

- PMHx:                                                                                           

08:55 Asthma; diabetes mellitus; Hypertension;                                                mb9 

                                                                                                  

- Immunization history:: Client reports receiving the 2nd dose of the Covid vaccine.              

- Infectious Disease History:: Denies.                                                            

- Social history:: Smoking status: unknown.                                                       

                                                                                                  

                                                                                                  

Screenin:55 [facility] ED Fall Risk Assessment (Adult) History of falling in the last 3 months,       mb9 

      including since admission No falls in past 3 months (0 pts) Confusion or Disorientation     

      No (0 pts) Intoxicated or Sedated No (0 pts) Impaired Gait No (0 pts) Mobility Assist       

      Device Used No (0 pt) Altered Elimination No (0 pt) Score/Fall Risk Level 0 - 2 = Low       

      Risk Oriented to surroundings, Maintained a safe environment, Educated pt \T\ family on     

      fall prevention, incl call for assistance when getting out of bed. Abuse screen: Denies     

      threats or abuse. Nutritional screening: No deficits noted. Tuberculosis screening: No      

      symptoms or risk factors identified.                                                        

                                                                                                  

Assessment:                                                                                       

09:20 General: Appears uncomfortable, Behavior is cooperative. Pain: Complains of pain in     mb9 

      abdomen Pain radiates to left lower quadrant Pain currently is 8 out of 10 on a pain        

      scale. Quality of pain is described as sharp, stabbing, Pain began gradually, Is            

      continuous. Pain: Aggravated by increased activity, repositioning. Neuro: Doll          

      Agitation-Sedation Scale (RASS): 0 - Alert and Calm Level of Consciousness is awake,        

      alert, obeys commands, Oriented to person, place, time, situation, Appropriate for age.     

      Cardiovascular: Heart tones S1 S2 present Patient's skin is warm and dry. Respiratory:      

      Airway is patent Respiratory effort is even, unlabored, Respiratory pattern is regular,     

      symmetrical, Breath sounds are clear bilaterally. GI: Abdomen is round non-distended,       

      Bowel sounds present X 4 quads. Abd is soft Abdomen is tender to palpation in left          

      lower quadrant. : No signs and/or symptoms were reported regarding the genitourinary      

      system. EENT: No signs and/or symptoms were reported regarding the EENT system. Derm:       

      Skin is pink, warm \T\ dry. Musculoskeletal: Range of motion: intact in all extremities.    

10:25 Reassessment: No changes from previously documented assessment. Patient and/or family   mb9 

      updated on plan of care and expected duration. Pain level reassessed. Patient is alert,     

      oriented x 3, equal unlabored respirations, skin warm/dry/pink.                             

11:11 Reassessment: Discharge pending wait time for medication administration.                mb9 

11:30 Reassessment: Patient and/or family updated on plan of care and expected duration. Pain mb9 

      level reassessed. Patient is alert, oriented x 3, equal unlabored respirations, skin        

      warm/dry/pink. Patient states feeling better. Patient states symptoms have improved.        

                                                                                                  

Vital Signs:                                                                                      

09:01  / 87; Pulse 80; Resp 17; Temp 98.7; Pulse Ox 100% ; Weight 122.47 kg; Height 5   ap3 

      ft. 1 in. ; Pain 8/10;                                                                      

10:25  / 70; Pulse 63; Resp 18; Pulse Ox 100% on R/A;                                   mb9 

11:30  / 74; Pulse 74; Resp 18; Pulse Ox 100% on R/A;                                   mb9 

09:01 Body Mass Index 51.02 (122.47 kg, 154.94 cm)                                            ap3 

09:01 Pain Scale: Adult                                                                       ap3 

                                                                                                  

ED Course:                                                                                        

08:48 Patient arrived in ED.                                                                  mg5 

08:49 Nirali Barnes MD is Attending Physician.                                                sp3 

08:51 Mallika Esposito, RN is Primary Nurse.                                              mb9 

08:55 Arm band placed on.                                                                     mb9 

08:56 Placed in gown. Bed in low position. Call light in reach. Side rails up X 1. Provided   mb9 

      Education on: press call light if needing anything. Client placed on continuous cardiac     

      and pulse oximetry monitoring. NIBP monitoring applied.                                     

09:04 Triage completed.                                                                       ap3 

09:21 No provider procedures requiring assistance completed. Initial lab(s) drawn, by me,     fernando 

      sent to lab. Inserted saline lock: 22 gauge in right antecubital area, using aseptic        

      technique. Blood collected. Flushed with 10 mL NS.                                          

09:27 CT Abd/Pelvis - IV Contrast Only In Process Unspecified.                                EDMS

10:52 Patient requests pain medication.                                                       mb9 

11:30 IV discontinued, intact, bleeding controlled, No redness/swelling at site. Pressure     mb9 

      dressing applied.                                                                           

                                                                                                  

Administered Medications:                                                                         

09:33 Drug: NS 0.9% IV 1000 ml IV at 1 bolus Per protocol; 1000 mL bolus Route: IV; Rate: 1   mb9 

      bolus; Site: right antecubital;                                                             

11:18 Follow up: Response: No adverse reaction; IV Status: Completed infusion                 mb9 

11:04 Drug: Ondansetron IVP 4 mg IVP once; over 2 minutes Route: IVP; Site: right antecubital;mb9 

11:08 Drug: morphine IVP or IV 4 mg IVP once over 4 mins Route: IVP; Infused Over: 4 mins;    mb9 

      Site: right antecubital;                                                                    

                                                                                                  

                                                                                                  

Medication:                                                                                       

08:56 VIS not applicable for this client.                                                     mb9 

                                                                                                  

Outcome:                                                                                          

11:10 Discharge ordered by MD.                                                                jeanna 

11:31 Discharged to home via wheelchair, with family,                                         fernando 

11: Condition: stable                                                                           

11:31 Discharge instructions given to patient, Instructed on discharge instructions, follow       

      up and referral plans. Demonstrated understanding of instructions, follow-up care,          

:31 Patient left the ED.                                                                    mb9 

                                                                                                  

Signatures:                                                                                       

Dispatcher [employer]                           EDAmarilys Mayo RN                    RN   [id number]                                                  

Nirali Barnes MD MD   sp3                                                  

Mallika Esposito RN                RN   mb9                                                  

Meri King                             mg5                                                  

                                                                                                  

**************************************************************************************************

## 2024-08-13 NOTE — RAD REPORT
EXAM DESCRIPTION:  CTAbdomen   Pelvis W Contrast - 8/13/2024 9:25 am

 

CLINICAL HISTORY:

LLQ pain;Abd pain

 

COMPARISON:  Abdomen   Pelvis W Contrast dated 4/15/2016

 

TECHNIQUE:  CT of the abdomen and pelvis was performed with IV contrast.

 

All CT scans are performed using dose optimization technique as appropriate and may include automated
 exposure control or mA/KV adjustment according to patient size.

 

FINDINGS:  Lower chest: No acute abnormality.

Liver: No acute abnormality or suspicious lesions.

Biliary: Cholecystectomy .

Stomach: No significant focal abnormality.

Duodenum: No significant focal abnormality.

Pancreas: No significant abnormality.

Spleen: No significant abnormality.

Adrenal: No suspicious lesions.

Kidney/ureter: No hydronephrosis. No renal calculi.

Retroperitoneum: No retroperitoneal adenopathy.

Vascular: No aneurysm.

Bowel: Appendectomy.. Diverticulosis.

Peritoneum: No ascites or free air.

Bladder: Grossly unremarkable.

Reproductive: Hysterectomy.

Bones: No acute fracture. Grade 1 anterolisthesis of L4 on L5. Multilevel degenerative changes are pr
esent in the spine.

Other: n/a

 

IMPRESSION:  No acute intra-abdominal or pelvic finding. Appendectomy. Diverticulosis without diverti
culitis.

## 2025-01-23 ENCOUNTER — HOSPITAL ENCOUNTER (EMERGENCY)
Dept: HOSPITAL 97 - ER | Age: 70
Discharge: HOME | End: 2025-01-23
Payer: COMMERCIAL

## 2025-01-23 VITALS — DIASTOLIC BLOOD PRESSURE: 71 MMHG | SYSTOLIC BLOOD PRESSURE: 144 MMHG

## 2025-01-23 VITALS — TEMPERATURE: 98.2 F

## 2025-01-23 VITALS — OXYGEN SATURATION: 99 %

## 2025-01-23 DIAGNOSIS — E11.40: Primary | ICD-10-CM

## 2025-01-23 LAB
ANION GAP SERPL CALC-SCNC: 7.3 MEQ/L (ref 5–15)
BUN BLD-MCNC: 31 MG/DL (ref 7–18)
D DIMER BLD IA.DDU-MCNC: 0.56 FEUUG/ML (ref 0–0.5)
GLUCOSE SERPLBLD-MCNC: 236 MG/DL (ref 74–106)
HCT VFR BLD CALC: 36.6 % (ref 36–45)
HGB BLD-MCNC: 12 G/DL (ref 12–15)
INR BLD: 1.22
LYMPHOCYTES # SPEC AUTO: 2 K/UL (ref 0.7–4.9)
MAGNESIUM SERPL-MCNC: 1.9 MG/DL (ref 1.6–2.4)
MCH RBC QN AUTO: 27.7 PG (ref 27–35)
MCHC RBC AUTO-ENTMCNC: 32.8 G/DL (ref 32–36)
MCV RBC: 84.6 FL (ref 80–100)
NRBC # BLD: 0 10*3/UL (ref 0–0)
NRBC BLD AUTO-RTO: 0 % (ref 0–0)
PMV BLD: 9 FL (ref 7.6–11.3)
POTASSIUM SERPL-SCNC: 4.3 MEQ/L (ref 3.5–5.1)
PROTHROMBIN TIME: 12.8 SECONDS (ref 9.4–12.5)
RBC # BLD: 4.32 M/UL (ref 3.86–4.86)
TROPONIN I SERPL HS-MCNC: 7.5 PG/ML (ref ?–58.9)
WBC # BLD AUTO: 9.4 THOU/UL (ref 4.3–10.9)

## 2025-01-23 PROCEDURE — 85379 FIBRIN DEGRADATION QUANT: CPT

## 2025-01-23 PROCEDURE — 71045 X-RAY EXAM CHEST 1 VIEW: CPT

## 2025-01-23 PROCEDURE — 84484 ASSAY OF TROPONIN QUANT: CPT

## 2025-01-23 PROCEDURE — 93971 EXTREMITY STUDY: CPT

## 2025-01-23 PROCEDURE — 83735 ASSAY OF MAGNESIUM: CPT

## 2025-01-23 PROCEDURE — 85610 PROTHROMBIN TIME: CPT

## 2025-01-23 PROCEDURE — 36415 COLL VENOUS BLD VENIPUNCTURE: CPT

## 2025-01-23 PROCEDURE — 96374 THER/PROPH/DIAG INJ IV PUSH: CPT

## 2025-01-23 PROCEDURE — 99284 EMERGENCY DEPT VISIT MOD MDM: CPT

## 2025-01-23 PROCEDURE — 85025 COMPLETE CBC W/AUTO DIFF WBC: CPT

## 2025-01-23 PROCEDURE — 96375 TX/PRO/DX INJ NEW DRUG ADDON: CPT

## 2025-01-23 PROCEDURE — 80048 BASIC METABOLIC PNL TOTAL CA: CPT

## 2025-01-23 NOTE — RAD REPORT
EXAM:Extremity Venous Uni Ltd



HISTORY: Right leg pain



TECHNIQUE: Sonographic evaluation right lower extremity performed.Grayscale, color and spectral lizbeth
sis performed on all vessels 





COMPARISON: 2015. 



FINDINGS: 



Right common femoral, superficial femoral, greater saphenous, popliteal and posterior tibial veins ar
e compressible and demonstrate augmentation. Doppler demonstrates good flow. 



2 cm Baker's cyst





IMPRESSION: 



No evidence of deep venous thrombosis involving the right lower extremity. 



2 cm Baker's cyst



Reported By: Yoel Bernal 

Electronically Signed:  1/23/2025 11:48 AM

## 2025-01-23 NOTE — ER
Nurse's Notes                                                                                     

 [facility]                                                                 

Name: Liane Burk                                                                               

Age: 69 yrs                                                                                       

Sex: Female                                                                                       

: 1955                                                                                   

MRN: [id number]                                                                                   

Arrival Date: 2025                                                                          

Time: 09:38                                                                                       

Account#: [id number]                                                                            

Bed 19                                                                                            

Private MD:                                                                                       

Diagnosis: Type 2 diabetes mellitus with diabetic neuropathy, unspecified;Pain in leg, unspecified

                                                                                                  

Presentation:                                                                                     

                                                                                             

09:56 Chief complaint: Patient states: she has had right leg pain for approx one week, that   ap3 

      is also located behind the right knee. patient currently rates her pain as a 10/10 on       

      the pain scale. Coronavirus screen: At this time, the client does not indicate any          

      symptoms associated with coronavirus-19. Ebola Screen: No symptoms or risks identified      

      at this time. Initial Sepsis Screen: Does the patient meet any 2 criteria? No.              

      Patient's initial sepsis screen is negative. Does the patient have a suspected source       

      of infection? No. Patient's initial sepsis screen is negative. Risk Assessment: Do you      

      want to hurt yourself or someone else? Patient reports no desire to harm self or            

      others. Onset of symptoms was 2025. Transition of care: patient was not         

      received from another setting of care.                                                      

09:56 Method Of Arrival: Wheelchair                                                           ap3 

09:56 Acuity: ROGELIO 3                                                                           ap3 

                                                                                                  

Triage Assessment:                                                                                

09:59 General: Appears uncomfortable, Behavior is calm, cooperative, appropriate for age.     ap3 

      Pain: Complains of pain in right leg Pain currently is 10 out of 10 on a pain scale.        

      Neuro: Level of Consciousness is awake, alert, obeys commands, Oriented to person,          

      place, time, situation, Appropriate for age. Cardiovascular: Patient's skin is warm and     

      dry. Respiratory: Airway is patent Respiratory effort is even, unlabored, Respiratory       

      pattern is regular, symmetrical.                                                            

                                                                                                  

Historical:                                                                                       

- Allergies:                                                                                      

09:59 PENICILLINS;                                                                            ap3 

- PMHx:                                                                                           

09:59 Asthma; diabetes mellitus; Hypertension;                                                ap3 

                                                                                                  

- Immunization history:: Client reports receiving the 2nd dose of the Covid vaccine,              

  Flu vaccine is not up to date.                                                                  

- Infectious Disease History:: Denies.                                                            

- Social history:: Smoking status: Patient denies any tobacco usage or history of.                

                                                                                                  

                                                                                                  

Screening:                                                                                        

10:00 Abuse screen: Denies threats or abuse. Nutritional screening: No deficits noted.        ap3 

      Tuberculosis screening: No symptoms or risk factors identified.                             

13:07 [facility] ED Fall Risk Assessment (Adult) History of falling in the last 3 months,       bp  

      including since admission No falls in past 3 months (0 pts) Confusion or Disorientation     

      No (0 pts) Intoxicated or Sedated No (0 pts) Impaired Gait No (0 pts) Mobility Assist       

      Device Used No (0 pt) Altered Elimination No (0 pt) Score/Fall Risk Level 0 - 2 = Low       

      Risk Oriented to surroundings.                                                              

                                                                                                  

Assessment:                                                                                       

10:00 General: Appears in no apparent distress. uncomfortable, obese, Behavior is             bp  

      cooperative, appropriate for age, anxious.                                                  

10:50 Reassessment: Patient appears in no apparent distress at this time. Patient is alert,   bp  

      oriented x 3, equal unlabored respirations, skin warm/dry/pink.                             

13:06 Reassessment: DC VIA WHEELCHAIR WITH FAMILY.                                            bp  

                                                                                                  

Vital Signs:                                                                                      

09:56  / 71; Pulse 72; Resp 17; Temp 98.2; Pulse Ox 100% on R/A; Weight 124.28 kg;      bp  

      Height 5 ft. 0 in. ; Pain 10/10;                                                            

10:46  / 73; Pulse 58; Resp 15; Pulse Ox 99% ;                                          bp  

13:06  / 71; Pulse 61; Resp 16; Pulse Ox 99% ;                                          bp  

09:56 Body Mass Index 53.51 (124.28 kg, 152.4 cm)                                             bp  

09:56 Pain Scale: Adult                                                                       bp  

                                                                                                  

ED Course:                                                                                        

09:43 Patient arrived in ED.                                                                  ra3 

09:46 Carlos Gilmore, RN is Primary Nurse.                                                    bp  

09:53 Sergo Lucero MD is Attending Physician.                                               bo1 

09:59 Triage completed.                                                                       ap3 

10:00 Arm band placed on right wrist.                                                         ap3 

10:12 Initial lab(s) drawn, by me, sent to lab. Inserted saline lock: 20 gauge in right       ty  

      antecubital area, using aseptic technique. Blood collected. Flushed with 10 mL NS.          

10:18 XRAY Chest (1 view) In Process Unspecified.                                             EDMS

10:50 Patient has correct armband on for positive identification.                             bp  

11:34 Extremity Venous Uni Ltd US In Process Unspecified.                                     EDMS

13:07 Provided Education on: NA.                                                              bp  

13:07 No provider procedures requiring assistance completed. IV discontinued, intact,         bp  

      bleeding controlled, No redness/swelling at site. Pressure dressing applied.                

                                                                                                  

Administered Medications:                                                                         

10:29 Drug: Ondansetron IVP 4 mg IVP once; over 2 minutes Route: IVP; Site: right antecubital;bp  

13:06 Follow up: Response: No adverse reaction                                                bp  

10:29 Drug: morphine IVP or IV 4 mg IVP once over 4 mins Route: IVP; Infused Over: 4 mins;    bp  

      Site: right antecubital;                                                                    

13:06 Follow up: Response: No adverse reaction                                                bp  

12:30 Drug: traMADol  mg PO once Route: PO;                                             bp  

13:06 Follow up: Response: No adverse reaction                                                bp  

                                                                                                  

                                                                                                  

Outcome:                                                                                          

12:24 Discharge ordered by MD. greene 

13:07 Discharged to home via wheelchair, with family,                                         bp  

13:07 Condition: stable                                                                           

13:07 Discharge instructions given to patient, Instructed on discharge instructions, follow       

      up and referral plans. Demonstrated understanding of instructions, follow-up care,          

      medications, Prescriptions given X 1,                                                       

13:08 Patient left the ED.                                                                    bp  

                                                                                                  

Signatures:                                                                                       

Dispatcher [employer]                           EDMS                                                 

Carlos Gilmore RN RN   bp                                                   

Amarilys Givens RN RN   ap3                                                  

Joellen Arguello                                   ra3                                                  

Andriy Cadet Benjamin, MD MD   bo1                                                  

                                                                                                  

Corrections: (The following items were deleted from the chart)                                    

10:28 09:56 Pulse 72bpm; Resp 17bpm; Pulse Ox 100% RA; Temp 98.2F; 124.28 kg; Height 5 ft. 0  bp  

      in.; BMI: 53.5; Pain 10/10, Adult; ap3                                                      

                                                                                                  

**************************************************************************************************

## 2025-01-23 NOTE — EDPHYS
Physician Documentation                                                                           

 [facility]                                                                 

Name: Liane Burk                                                                               

Age: 69 yrs                                                                                       

Sex: Female                                                                                       

: 1955                                                                                   

MRN: [id number]                                                                                   

Arrival Date: 2025                                                                          

Time: 09:38                                                                                       

Account#: [id number]                                                                            

Bed 19                                                                                            

Private MD:                                                                                       

ED Physician Sergo Lucero                                                                        

HPI:                                                                                              

                                                                                             

09:57 This 69 yrs old Black Female presents to ER via Unassigned with complaints of Leg Pain. bo1 

09:57 The patient presents with pain, that is acute. The complaints affect the lateral aspect bo1 

      of right knee and lateral aspect of right calf. Context: the patient is able to             

      ambulate, "Hopping", No trauma or fall. Onset: The symptoms/episode began/occurred          

      acutely, 1 week(s) ago. Modifying factors: the symptoms are aggravated by movement, Sxs     

      better if on the left side. Pt known to be a diabetic.                                      

                                                                                                  

Historical:                                                                                       

- Allergies:                                                                                      

09:59 PENICILLINS;                                                                            ap3 

- PMHx:                                                                                           

09:59 Asthma; diabetes mellitus; Hypertension;                                                ap3 

                                                                                                  

- Immunization history:: Client reports receiving the 2nd dose of the Covid vaccine,              

  Flu vaccine is not up to date.                                                                  

- Infectious Disease History:: Denies.                                                            

- Social history:: Smoking status: Patient denies any tobacco usage or history of.                

                                                                                                  

                                                                                                  

ROS:                                                                                              

10:03 Constitutional: Negative for fever, chills, and weight loss                             bo1 

10:03 Cardiovascular: Negative for chest pain, palpitations,                                      

10:03 Respiratory: Negative for cough, shortness of breath,                                       

10:03 Abdomen/GI: Negative for abdominal pain, nausea and vomiting,                               

10:03 MS/extremity: Positive for pain, of the right leg - posterior calf to the knee,             

10:03 Skin: Negative for rash,                                                                    

10:03 All other systems are negative,                                                             

                                                                                                  

Exam:                                                                                             

12:18 Constitutional:  This is a well developed, well nourished patient who is awake, alert,  bo1 

      and in acute distress.                                                                      

12:18 Constitutional: The patient appears alert, awake, obese, in obvious pain,                   

      uncomfortable,                                                                              

12:18 Neck: External neck: is normal, no acute changes,                                           

12:18 Chest/axilla: Inspection: normal, no acute changes,                                         

12:18 Cardiovascular: Rate: bradycardic, Rhythm: regular, Pulses: no pulse deficits are           

      appreciated,                                                                                

12:18 Respiratory: the patient does not display signs of respiratory distress,  Respirations:     

      normal, no acute changes, Breath sounds: are clear throughout,                              

12:18 Abdomen/GI: Inspection: obese Palpation: abdomen is soft and non-tender, voluntary          

      guarding, is not appreciated, involuntary guarding, is not appreciated,                     

12:18 Musculoskeletal/extremity: Extremities: grossly normal except: pain, Right calf w/o         

      palpable cords or tenseness to the compartment,                                             

12:18 Skin: Appearance: Color: normal in color, Temperature: normal temperature, swelling, is     

      not appreciated,                                                                            

12:18 Neuro: Distal NV intact,                                                                    

                                                                                                  

Vital Signs:                                                                                      

09:56  / 71; Pulse 72; Resp 17; Temp 98.2; Pulse Ox 100% on R/A; Weight 124.28 kg;      bp  

      Height 5 ft. 0 in. ; Pain 10/10;                                                            

10:46  / 73; Pulse 58; Resp 15; Pulse Ox 99% ;                                          bp  

13:06  / 71; Pulse 61; Resp 16; Pulse Ox 99% ;                                          bp  

09:56 Body Mass Index 53.51 (124.28 kg, 152.4 cm)                                             bp  

09:56 Pain Scale: Adult                                                                       bp  

                                                                                                  

MDM:                                                                                              

09:53 Medical Screening Exam initiated                                                        bo1 

12:21 Differential diagnosis: DVT vs diabetic neuropathy. Data reviewed: vital signs, lab     bo1 

      test result(s), radiologic studies, doppler, ultrasound. Response to treatment: the         

      patient's symptoms have markedly improved after treatment. ED course: Discussed with        

      the pt, she was taken off gabapentin about 3 years ago. Pt will revisit that decision       

      or consider trial of Lyrica. Will see PCP ASAP for pain control. Family present agree..     

                                                                                                  

                                                                                             

10:00 Order name: Basic Metabolic Panel; Complete Time: 10:48                                                                                                                              

10:00 Order name: CBC with Diff; Complete Time: 10:48                                                                                                                                      

10:00 Order name: D-Dimer; Complete Time: 10:48                                                                                                                                            

10:00 Order name: Magnesium; Complete Time: 10:48                                                                                                                                          

10:00 Order name: PT-INR; Complete Time: 10:48                                                                                                                                             

10:00 Order name: Troponin HS; Complete Time: 10:48                                                                                                                                        

10:00 Order name: XRAY Chest (1 view); Complete Time: 10:48                                                                                                                                

10:49 Order name: Extremity Venous Uni Ltd US; Complete Time: 11:58                                                                                                                        

10:00 Order name: Cardiac monitoring; Complete Time: 10:14                                                                                                                                 

10:00 Order name: EKG - Nurse/Tech; Complete Time: 13:06                                                                                                                                   

10:00 Order name: IV Saline Lock; Complete Time: 10:                                                                                             

10:00 Order name: Labs collected and sent; Complete Time: 10:21                               bo1 

01/23                                                                                             

10:00 Order name: O2 Per Protocol; Complete Time: 10:21                                       bo1 

01/23                                                                                             

10:00 Order name: O2 Sat Monitoring; Complete Time: 10:21                                     bo 

                                                                                                  

Administered Medications:                                                                         

10:29 Drug: Ondansetron IVP 4 mg IVP once; over 2 minutes Route: IVP; Site: right antecubital;bp  

13:06 Follow up: Response: No adverse reaction                                                bp  

10:29 Drug: morphine IVP or IV 4 mg IVP once over 4 mins Route: IVP; Infused Over: 4 mins;    bp  

      Site: right antecubital;                                                                    

13:06 Follow up: Response: No adverse reaction                                                bp  

12:30 Drug: traMADol  mg PO once Route: PO;                                             bp  

13:06 Follow up: Response: No adverse reaction                                                bp  

                                                                                                  

                                                                                                  

Disposition Summary:                                                                              

25 12:24                                                                                    

Discharge Ordered                                                                                 

 Notes:       Location: Home                                                                        
  bo1

      Problem: new                                                                            bo1 

      Symptoms: have improved                                                                 bo1 

      Condition: Stable                                                                       bo1 

      Diagnosis                                                                                   

        - Type 2 diabetes mellitus with diabetic neuropathy, unspecified                      bo1 

        - Pain in leg, unspecified                                                            bo1 

      Followup:                                                                               bo1 

        - With: Private Physician                                                                  

        - When: Upon discharge from the Emergency Department                                       

        - Reason: Recheck today's complaints, Continuance of care                                  

      Discharge Instructions:                                                                     

        - Discharge Summary Sheet                                                             bo1 

        - Neuropathic Pain                                                                    bo1 

        - Diabetic Neuropathy                                                                 bo1 

      Forms:                                                                                      

        - Medication Reconciliation Form                                                      bo1 

        - Antibiotic Education                                                                bo1 

        - Prescription Opioid Use                                                             bo1 

        - Patient Portal Instructions                                                         bo1 

        - Leadership Thank You Letter                                                         bo1 

      Prescriptions:                                                                              

        - Tramadol 50 mg Oral tablet                                                               

            - take 1 tablet ORAL route every 6 hours Take 1-2 tabs PO every 6 hours, max 8    bo1 

      per day; 25 tablet; Refills: 0, Product Selection Permitted                                 

Signatures:                                                                                       

Dispatcher [facility]                           Carlos Devine RN                      RN   Amarilys Oliver RN                    RN   ap3                                                  

Sergo Lucero MD MD   bo1                                                  

                                                                                                  

Corrections: (The following items were deleted from the chart)                                    

10:00 10:00 Chest Single View+RAD.RAD.BRZ ordered. EDMS                                       EDMS

10:50 10:50 Extremity Venous Uni Ltd+US.RAD.BRZ ordered. EDMS                                 EDMS

                                                                                                  

**************************************************************************************************

## 2025-01-23 NOTE — RAD REPORT
Procedure: Chest Single View



HISTORY: Pain



COMPARISON: 2019



FINDINGS:



The lungs appear clear of acute infiltrate.



No significant pleural effusion noted.



The heart is mildly to moderately enlarged..



IMPRESSION:



No acute abnormality is displayed.



Reported By: Yoel Bernal 

Electronically Signed:  1/23/2025 10:22 AM